# Patient Record
Sex: MALE | Race: WHITE
[De-identification: names, ages, dates, MRNs, and addresses within clinical notes are randomized per-mention and may not be internally consistent; named-entity substitution may affect disease eponyms.]

---

## 2020-11-09 PROBLEM — Z00.00 ENCOUNTER FOR PREVENTIVE HEALTH EXAMINATION: Status: ACTIVE | Noted: 2020-11-09

## 2020-11-10 ENCOUNTER — APPOINTMENT (OUTPATIENT)
Dept: OTOLARYNGOLOGY | Facility: CLINIC | Age: 50
End: 2020-11-10
Payer: MEDICARE

## 2020-11-10 VITALS — BODY MASS INDEX: 27.46 KG/M2 | HEIGHT: 63 IN | WEIGHT: 155 LBS

## 2020-11-10 DIAGNOSIS — Z87.891 PERSONAL HISTORY OF NICOTINE DEPENDENCE: ICD-10-CM

## 2020-11-10 PROCEDURE — 99204 OFFICE O/P NEW MOD 45 MIN: CPT

## 2020-11-10 PROCEDURE — 99072 ADDL SUPL MATRL&STAF TM PHE: CPT

## 2020-11-10 NOTE — HISTORY OF PRESENT ILLNESS
[de-identified] : LETI LOREDO has a history of hearing loss and tinnitus in the right ear since a fall with head injury in 2012. Surgery necessary for c-spine injury. Complete and total hearing loss noted with loud tinnitus now getting worse.  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  Multiple tones noted in the right ear.  Prior  experience. \par No facial nerve injury\par using cross amplification

## 2020-11-10 NOTE — REASON FOR VISIT
[Initial Consultation] : an initial consultation for [Ear Pain] : ear pain [Tinnitus] : tinnitus [Vertigo] : vertigo

## 2020-11-10 NOTE — CONSULT LETTER
[Please see my note below.] : Please see my note below. [FreeTextEntry2] : Dear Dr Arun Drew [FreeTextEntry1] : Thank you for allowing me to participate in the care of LETI LOREDO .\par Please see the attached visit note.\par \par \par \par Max Ashley\par Otology\par Medical Director of [facility]\par Department of Otolaryngology\par [facility]  with patient

## 2020-11-10 NOTE — ASSESSMENT
[FreeTextEntry1] : Interested tinnitus in the right ear as many years duration stemming from a temporal bone/head injury. I've discussed measure strategies for tinnitus. I have recommended coping strategies. He may be a candidate for cochlear implantation for single sided deafness and tinnitus. This would depend on the preservation of cochlear anatomy and the cochlear nerve. He expressed interest in pursuing this.\par \par CT scan of the temporal bone ordered. He was instructed to followup with me by phone or in person to review these images

## 2023-10-18 ENCOUNTER — APPOINTMENT (OUTPATIENT)
Dept: NEUROLOGY | Facility: CLINIC | Age: 53
End: 2023-10-18
Payer: MEDICARE

## 2023-10-18 VITALS
DIASTOLIC BLOOD PRESSURE: 80 MMHG | HEIGHT: 63 IN | BODY MASS INDEX: 30.11 KG/M2 | SYSTOLIC BLOOD PRESSURE: 134 MMHG | HEART RATE: 108 BPM

## 2023-10-18 VITALS — BODY MASS INDEX: 28.32 KG/M2 | HEIGHT: 65 IN | WEIGHT: 170 LBS

## 2023-10-18 DIAGNOSIS — Z80.9 FAMILY HISTORY OF MALIGNANT NEOPLASM, UNSPECIFIED: ICD-10-CM

## 2023-10-18 DIAGNOSIS — Z81.8 FAMILY HISTORY OF OTHER MENTAL AND BEHAVIORAL DISORDERS: ICD-10-CM

## 2023-10-18 PROCEDURE — 99204 OFFICE O/P NEW MOD 45 MIN: CPT

## 2023-10-27 ENCOUNTER — APPOINTMENT (OUTPATIENT)
Dept: NEUROLOGY | Facility: CLINIC | Age: 53
End: 2023-10-27
Payer: MEDICARE

## 2023-10-27 PROCEDURE — 95911 NRV CNDJ TEST 9-10 STUDIES: CPT

## 2023-10-27 PROCEDURE — 95886 MUSC TEST DONE W/N TEST COMP: CPT

## 2023-11-21 ENCOUNTER — APPOINTMENT (OUTPATIENT)
Dept: NEUROLOGY | Facility: CLINIC | Age: 53
End: 2023-11-21
Payer: MEDICARE

## 2023-11-21 PROCEDURE — 95816 EEG AWAKE AND DROWSY: CPT

## 2023-12-04 ENCOUNTER — APPOINTMENT (OUTPATIENT)
Dept: NEUROLOGY | Facility: CLINIC | Age: 53
End: 2023-12-04

## 2023-12-05 ENCOUNTER — APPOINTMENT (OUTPATIENT)
Dept: NEUROLOGY | Facility: CLINIC | Age: 53
End: 2023-12-05
Payer: MEDICARE

## 2023-12-05 PROCEDURE — 95719 EEG PHYS/QHP EA INCR W/O VID: CPT

## 2023-12-12 ENCOUNTER — APPOINTMENT (OUTPATIENT)
Dept: NEUROPSYCHOLOGY | Facility: CLINIC | Age: 53
End: 2023-12-12

## 2023-12-12 ENCOUNTER — OUTPATIENT (OUTPATIENT)
Dept: OUTPATIENT SERVICES | Facility: HOSPITAL | Age: 53
LOS: 1 days | End: 2023-12-12
Payer: MEDICARE

## 2023-12-12 DIAGNOSIS — G31.84 MILD COGNITIVE IMPAIRMENT OF UNCERTAIN OR UNKNOWN ETIOLOGY: ICD-10-CM

## 2023-12-12 PROCEDURE — 96121 NUBHVL XM PHY/QHP EA ADDL HR: CPT

## 2023-12-12 PROCEDURE — 96116 NUBHVL XM PHYS/QHP 1ST HR: CPT

## 2023-12-13 DIAGNOSIS — G31.84 MILD COGNITIVE IMPAIRMENT OF UNCERTAIN OR UNKNOWN ETIOLOGY: ICD-10-CM

## 2023-12-14 ENCOUNTER — APPOINTMENT (OUTPATIENT)
Dept: NEUROPSYCHOLOGY | Facility: CLINIC | Age: 53
End: 2023-12-14

## 2023-12-14 ENCOUNTER — OUTPATIENT (OUTPATIENT)
Dept: OUTPATIENT SERVICES | Facility: HOSPITAL | Age: 53
LOS: 1 days | End: 2023-12-14
Payer: MEDICARE

## 2023-12-14 DIAGNOSIS — G31.84 MILD COGNITIVE IMPAIRMENT OF UNCERTAIN OR UNKNOWN ETIOLOGY: ICD-10-CM

## 2023-12-14 PROCEDURE — 96133 NRPSYC TST EVAL PHYS/QHP EA: CPT

## 2023-12-14 PROCEDURE — 96136 PSYCL/NRPSYC TST PHY/QHP 1ST: CPT

## 2023-12-14 PROCEDURE — 96137 PSYCL/NRPSYC TST PHY/QHP EA: CPT

## 2023-12-14 PROCEDURE — 96132 NRPSYC TST EVAL PHYS/QHP 1ST: CPT

## 2023-12-15 ENCOUNTER — APPOINTMENT (OUTPATIENT)
Dept: NEUROLOGY | Facility: CLINIC | Age: 53
End: 2023-12-15
Payer: MEDICARE

## 2023-12-15 VITALS
SYSTOLIC BLOOD PRESSURE: 127 MMHG | BODY MASS INDEX: 30.12 KG/M2 | WEIGHT: 170 LBS | DIASTOLIC BLOOD PRESSURE: 85 MMHG | HEART RATE: 93 BPM | HEIGHT: 63 IN

## 2023-12-15 DIAGNOSIS — R41.3 UNSPECIFIED INJURY OF HEAD, INITIAL ENCOUNTER: ICD-10-CM

## 2023-12-15 DIAGNOSIS — S09.90XA UNSPECIFIED INJURY OF HEAD, INITIAL ENCOUNTER: ICD-10-CM

## 2023-12-15 PROCEDURE — 99215 OFFICE O/P EST HI 40 MIN: CPT

## 2023-12-15 PROCEDURE — 99214 OFFICE O/P EST MOD 30 MIN: CPT

## 2023-12-15 NOTE — HISTORY OF PRESENT ILLNESS
[FreeTextEntry1] : Original Presentation ; Mr. 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 He was brought to the ED in  and CTH revealed and facial fractures and Xray of the wrist revealed a left wrist fracture. He was found to have a herniated cervical disc at C5 and subsequently had a discectomy. Patient has had neck and back pain since the accident.  Patient has also had headaches on a regular basis since the accident. He awakens with the headache. He also complains of memory loss, dizziness, insomnia, word finding difficulty, and confusion. He was using Adderall for confusion and word finding difficulty, Fioricet for the headaches, and amitriptyline for the insomnia. Patient has decreased concentration and fear of driving. He is looking for a doctor to prescribe his medication as he has been unable to find one who takes his insurance. He has no results from his work up that was done 11 years ago. Patient has not worked since the accident and is not currently working.  Diagnostic Testing :  MRI Brain 11.3.23: Within normal limits  EEg 12.4.23 : normal  EMG [place] : Normal   Today : Today I had the pleasure of seeing  Mr. Mina  in our office for follow up.  The patients previous history and physical findings have been reviewed.    Mr. Mina remains under our neurologic care for post concussive syndrome, chronic cervical radiculopathy, memory changes, dizziness and post concussive headaches which began following a work related injury DOI 12.8.2012 (case now closed) establishing new care.  Today, we reviewed the results of his MRI of the Brain, EEg, and EMG of the upper extremities noted above. MRI of the cervical spine was denied authorization and neuropsychological testing is still pending completion. Regarding his neck pain he is s/p C5 discectomy preformed by Dr. Leonardo at [facility] following his injury. Today he report persistent neck pain isolated to his posterior cervical-occipital region without radiation down his arms. He rates his neck pain a 6-7/10 and a 9/10 when he has severe exacerbations and notes weakness in his RUE however on exam LUE appears weaker.  He has been participating in physical therapy for his neck and has completed 6 sessions and was approved for continued therapy. He states PT has helped slightly but still experiences daily pain and discomfort.  Previously under the care of Dr. Schwartz who prescribed Amitriptyline 100mg once at night which helped him sleep and helped with his headaches, Fioricet which he would take PRN for acute headaches and meloxicam for his neck pain howebver he is no lnger on any of these medications since switching providers and insurance. Today he reports persistent difficulty sleeping and reports an average of 4-5 hours a night due to difficulty falling and staying asleep. Regarding his post concussive headaches he reports daily headache episodes often associated with dizziness, sensitivity to light and occasionally nausea. He states his headaches are a mix of posterior tensions-type to his cervical- occipital region, some behind his eyes and other times to the his frontal lobe. For his headaches currently takes OTC Tylenol which he states doesn't relieve his symptoms but keeps it from progressing. Regarding his memory changes he reports difficulty with word finding, often jumps from topic to topic, and changes with both recent and remote memory. On exam he is Aox3 with significant impairments with word finding and fluency. At his last visit he was provided a referral for neuropsychological testing which he has an appointment for next week. 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

## 2023-12-15 NOTE — PHYSICAL EXAM
[General Appearance - Alert] : alert [General Appearance - In No Acute Distress] : in no acute distress [Oriented To Time, Place, And Person] : oriented to person, place, and time [Impaired Insight] : insight and judgment were intact [Affect] : the affect was normal [Mood] : the mood was normal [Person] : oriented to person [Place] : oriented to place [Time] : oriented to time [Remote Intact] : remote memory intact [Visual Intact] : visual attention was ~T not ~L decreased [Naming Objects] : no difficulty naming common objects [Repeating Phrases] : no difficulty repeating a phrase [Writing A Sentence] : no difficulty writing a sentence [Comprehension] : comprehension intact [Reading] : reading intact [Past History] : adequate knowledge of personal past history [Cranial Nerves Optic (II)] : visual acuity intact bilaterally,  visual fields full to confrontation, pupils equal round and reactive to light [Cranial Nerves Oculomotor (III)] : extraocular motion intact [Cranial Nerves Trigeminal (V)] : facial sensation intact symmetrically [Cranial Nerves Facial (VII)] : face symmetrical [Cranial Nerves Vestibulocochlear (VIII)] : hearing was intact bilaterally [Cranial Nerves Glossopharyngeal (IX)] : tongue and palate midline [Cranial Nerves Accessory (XI - Cranial And Spinal)] : head turning and shoulder shrug symmetric [Cranial Nerves Hypoglossal (XII)] : there was no tongue deviation with protrusion [Motor Tone] : muscle tone was normal in all four extremities [Motor Strength] : muscle strength was normal in all four extremities [No Muscle Atrophy] : normal bulk in all four extremities [Motor Strength Upper Extremities Bilaterally] : there was weakness in both upper extremities [Sensation Tactile Decrease] : light touch was intact [Balance] : balance was intact [2+] : Ankle jerk left 2+ [PERRL With Normal Accommodation] : pupils were equal in size, round, reactive to light, with normal accommodation [Extraocular Movements] : extraocular movements were intact [Hearing Threshold Finger Rub Not Harper] : hearing was normal [Abnormal Walk] : normal gait [Involuntary Movements] : no involuntary movements were seen [Short Term Intact] : short term memory impaired [Concentration Intact] : a decrease in concentrating ability was observed [Fluency] : fluency not intact [Motor Strength Lower Extremities Bilaterally] : strength was normal in both lower extremities [Past-pointing] : there was no past-pointing [Tremor] : no tremor present [Coordination - Dysmetria Impaired Finger-to-Nose Bilateral] : not present [Plantar Reflex Right Only] : normal on the right [Plantar Reflex Left Only] : normal on the left [FreeTextEntry6] : Right hand  4-/5 Left hand  5/5  RUE 4-/5 bicep flexion / triceps extension LUE 4-/5 triceps [FreeTextEntry1] : Limited ROM and cerico-occipital tenderness

## 2023-12-15 NOTE — REVIEW OF SYSTEMS
[Memory Lapses or Loss] : memory loss [Decr. Concentrating Ability] : decreased concentrating ability [Difficulty with Language] : ~M difficulty with language [Arm Weakness] : arm weakness [Dizziness] : dizziness [Vertigo] : vertigo [Migraine Headache] : migraine headaches [Tension Headache] : tension-type headaches

## 2023-12-15 NOTE — REASON FOR VISIT
[FreeTextEntry1] : Patient: Mike Mina   Referred by: Dr. Tejinder Parsons MD   YOB: 1970   Date of Intake: 12/12/2023   Age: 53   Evaluators: Dr. Kamilah Magallanes PsyD      Reason for Referral: Mike Mina is a 53-year-old, right hand dominant male with a history of traumatic brain injury (2012) who was referred for neuropsychological testing to determine his current cognitive and emotional status.       Examination Procedures: Clinical interview with the patient; Review of available records; Psychometric evaluation as listed in the appendix.       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 Patient reported that had loss of consciousness, facial fractures, right ear/hearing loss. He also had a fracture of the left wrist. He was found to have a herniated cervical disc at C5 and subsequently had a discectomy. Patient has had neck and back ongoing pain since the accident.      Cognitively, patient reported that experienced attention, short-term memory loss, word finding difficulties and slow motor processing. Patient added that he underwent neuropsychological evaluations during his work compensation and was referred to cognitive remediation therapy, which was found to be helpful at the time. Patient stated that his case was settled in 2017/2018 but given the ongoing symptoms now is seeking continue care.       That is, the patient presents today due to ongoing cognitive problems that indicated hasn't resolved and it's impacting his daily functioning. Patient reported continues to experience attentional problems such as forgetting what he set out to do, periods of loss of set and easily distracted. Furthermore, reported forgetfulness such as losing set mid conversation, difficulty retrieving the words he wants to express and misplacing things.       Patient underwent a neurological evaluation in October 2023 following ongoing headaches on a regular basis since the accident. He reported that awakens with the headache; from the occipital towards the top/parietal area described as a sharp pain. He also reported pressure headaches behind his eyes. He also complained of memory loss, dizziness, insomnia, word finding difficulty, and confusion. He reported using Adderall for confusion and word finding difficulty but not currently. He also reported taking Fioricet for the headaches, and amitriptyline for the insomnia but stopped taking it instead using over the counter for sleep.      Relevant Imaging and Labs:    MRI of the brain: Not available at this time. Patient was instructed to call radiology to obtain results.    EEG: Normal and Normal 24 Ambulatory monitoring.    EMG: study of the upper extremities results; normal    Cognitive Symptoms: attentional and memory problems   Physical Symptoms: headaches, dizziness and nauseas   Emotional Symptoms:  overwhelmed    ADLs/IADLs: independent    Sleep Behavior: disrupted due to neck and shoulder pain    Diet/Exercise: wnl      Medical History:    H/o TBI otherwise healthy    Family history includes cancer and dementia    Current Medications: not taking medications at this time; only unisom (otc) for sleep   Substance Use:       Psychiatric History:    Denied      Social History:   Education: Patient completed high school and some classes in college   Employment Status: worked in [employer] but stopped working 2012 after the head injury; currently in disability.     Marital/Family Status: lives with wife      Behavioral Observations:    Appearance: wnl    Demeanor: cooperative   Arousal: alert   Mood: euthymic   Affect: conguent   Motor System: wnl   Speech and Language: wnl; some word finding difficulty.   Cognitive Processes: wnl          Diagnostic Impression:       Post Concussion Syndrome vs Mild Neurocognitive Impairment Unspecified       Plan and Duration (check all that apply):       X Neuropsychological/Psychological Evaluation 	      Short term Goals (4 weeks; check all that apply):   X Evaluate Mood and Anxiety 	X Evaluate Cognition 		    X Provide Psychoeducation 			      Long-term Goal (8 weeks; check all that apply):   X Complete Evaluation and Provide Feedback to Patient, Family and/or Referral Source      Goal Setting/Treatment Initiation:   X Goals mutually agreed with patient and/or family    X Treatment established based on physician prescription      Number of anticipated sessions:    X testing sessions (120 min/session)   1 feedback session (60 min/session)      Prognosis:  Good       Special Considerations:  Hearing 	         Additional Treatment Rendered Today: None

## 2023-12-15 NOTE — REASON FOR VISIT
[Neuropsychology testing session] : Neuropsychology testing session [FreeTextEntry1] : Patient presents today for neuropsychological evaluation. Patient completed 1st part and will resume 2nd part next week.   Patient completed imaging of the brain, but results were not available. Patient was advised to reach out to radiology to obtain results.

## 2023-12-15 NOTE — ASSESSMENT
[FreeTextEntry1] : 53 year old male / female with post concussive syndrome, chronic cervical radiculopathy, memory changes, dizziness and post concussive headaches which began following a work related injury DOI 12.8.2012 (case now closed) establishing new care.  Today, we reviewed the results of his MRI of the Brain, EEg, and EMG of the upper extremities noted above. For his cervical radiculopathy he will continue physical therapy and we will re-submit for MRI C spine after 12.18.23. For his headaches and difficulty sleeping he will resume Amitriptyline 100mg once at night. for acute headaches he will trial Imitrex 100mg PRN. For isolated episodes of dizziness / vertigo he will begin meclizine 35mg PRN. For his memory / cognitive changes he will complete neuropsychologic testing and will return to the office in 1 month to review his results.    *All potential risks, benefits, side effects and interactions of the medications prescribed where discussed with patient at the time of todays encounter. Patient is aware that they may call/ contact the office at any time if he has any additional questions or concerns regarding his management.

## 2023-12-19 ENCOUNTER — APPOINTMENT (OUTPATIENT)
Dept: NEUROPSYCHOLOGY | Facility: CLINIC | Age: 53
End: 2023-12-19

## 2023-12-19 ENCOUNTER — OUTPATIENT (OUTPATIENT)
Dept: OUTPATIENT SERVICES | Facility: HOSPITAL | Age: 53
LOS: 1 days | End: 2023-12-19

## 2023-12-19 DIAGNOSIS — G31.84 MILD COGNITIVE IMPAIRMENT OF UNCERTAIN OR UNKNOWN ETIOLOGY: ICD-10-CM

## 2023-12-19 NOTE — REASON FOR VISIT
[Neuropsychology testing session] : Neuropsychology testing session [FreeTextEntry1] : Patient presented for the neuropsychological evaluation; testing was completed. Report/Result to follow in 2 weeks.

## 2024-01-02 ENCOUNTER — APPOINTMENT (OUTPATIENT)
Dept: NEUROPSYCHOLOGY | Facility: CLINIC | Age: 54
End: 2024-01-02

## 2024-01-02 ENCOUNTER — OUTPATIENT (OUTPATIENT)
Dept: OUTPATIENT SERVICES | Facility: HOSPITAL | Age: 54
LOS: 1 days | End: 2024-01-02
Payer: MEDICARE

## 2024-01-02 DIAGNOSIS — G31.84 MILD COGNITIVE IMPAIRMENT OF UNCERTAIN OR UNKNOWN ETIOLOGY: ICD-10-CM

## 2024-01-02 PROCEDURE — 96132 NRPSYC TST EVAL PHYS/QHP 1ST: CPT | Mod: 95

## 2024-01-02 PROCEDURE — 96133 NRPSYC TST EVAL PHYS/QHP EA: CPT | Mod: 95

## 2024-01-03 DIAGNOSIS — G31.84 MILD COGNITIVE IMPAIRMENT OF UNCERTAIN OR UNKNOWN ETIOLOGY: ICD-10-CM

## 2024-01-05 NOTE — REASON FOR VISIT
[FreeTextEntry1] : Patient: Mike Mina   Referred by: Dr. Tejinder Parsons MD   YOB: 1970   Date of Intake: 12/12/2023   Age: 53   Evaluators: Dr. Kamilah Magallanes PsyD     Test Results:   Please see Scan report to find the data summary at the end of the report for information regarding tests administered, raw scores, and percentiles.   Patient's effort was evaluated with multiple formal standardized measures, which evidenced valid effort across all tests.    Test results demonstrated estimated premorbid functioning within the average to high average range; stronger verbal than nonverbal abilities. His neurocognitive performances were consistent with premorbid functioning except for weakness in the areas of processing speed moderated by deficient fine motor function. He evidenced variability with weaker verbal than nonverbal recall, suggesting mild retrieval difficulties with verbal information, but this was not consistent when recalling non-verbal information. His verbal recall improved with recognition. Overall, his ability to learn, retain and store new information was not indicative of a concern and did not evidence a memory impairment at this time. That is, his ability to recall verbal information likely moderated by hearing loss and attentional fluctuations; his non-verbal recall abilities were in the high average to superior ranges. His language and fluency performances were within the normal limits and there was no objective evidence of word finding difficulties during the evaluation. However, during conversational speech, he did show frequent pauses to search for words but able to come up with the words. Regarding his attention, he did evidence loss of set and set shifting errors at higher order demands (more complex items) but overall performances within the normal limits.    Taken together, his presentation demonstrates weak motor function negatively impacting his processing speed. This can be explained when required to do hand -eye coordination tasks as well when processing information in both verbal and nonverbal manner. The patient exhibits notable delays in processing and motor abilities, requiring additional time for information assimilation and effective execution. Under time constraints, pressure, or stressful conditions, the patient is likely to encounter challenges in these domains and further impacting his ability to remember certain details. Also, patient lost his hearing in the fall which can complicate such situations. Moreover, this processing delays can also explain his subjective symptoms of finding the words. His presentation does meet criteria for a mild neurocognitive disorder; however, etiology appears multifactorial as pain, depression, anxiety and sleep deprivation interfere with cognition.   In the current assessment, there is insufficient evidence to establish a direct correlation between the current presenting symptoms and the head injury that occurred over 13 years ago. Nonetheless, the lingering physical consequences stemming from the fall, leading to persistent pain and requiring multiple surgeries, provide a plausible explanation for the observed compromised processing and motor functions in the patient. Furthermore, the coexistence of symptoms indicative of depression, anxiety, and sleep deprivation adds complexity to the clinical picture. It is important to note that pain and depression often manifest concurrently, and disruptions in sleep patterns are recognized contributors to both processing and motor deficits. These interrelated factors may contribute to the exacerbation of the patient's overall presentation.     Diagnostic Impression: G31.84 Mild Neurocognitive Impairment due to another medical; cervical radiculopathy; Headaches.    Traumatic Brain Injury (2012)    Depression    Anxiety    Sleep Deprivation         Recommendations:       It is warranted to continue follow up with neurology and pain management as the patient's main symptoms are headaches, sleep deprivation and pain.       The patient will benefit from a psychological evaluation and psychotherapy and it's highly recommended given the likely permanent ongoing pain due to the post effects of the injury and multiple surgeries. He will benefit from a dynamic therapeutic approach such as ACT, CBT, Motivational Interview, and/or Solution Focused to address pain and mood symptoms related to his health and adjustment as well as living with disability. Psychology/psychotherapy will also aid with insomnia (racing thoughts) providing sleep hygiene recommendations.   Following motor weakness, the patient should attempt to slow down when engaging in fine motor tasks and focus on accuracy and precision, rather than speed. Also, the patient should continue to engage in (approved by physician) physical activities. The patient is currently receiving PT addressing his physical pain and injuries and should continue to follow up.        Memory techniques such as using imagery and visual mnemonics to remember verbal material may be used, given the patient's good ability to remember routine visual information. Also, Repeated exposure to new material, cues, and reminders are highly recommended to improve memory and learning.        Following the patient's processing/motor cognitive weaknesses, Mr. Mina will benefit from maintaining himself cognitively stimulated as well as socially active to promote overall well-being. He will benefit from cognitive training/remediation program but if such is not feasible there are also cognitive training phone apps or workbooks that he will benefit from as well.      It is noteworthy to mention that depression and pain coexist thus pain and depression create a vicious cycle in which pain worsens symptoms of depression, and vice versa. As a result, I recommend psychotherapy and/or hobbies to maintain an active lifestyle. Given that the patient is currently disability, he is at high risk for sedentary lifestyle/behaviors which is well known for mental health and general health risks.       Following head injury and family history of dementia, it is recommended that patient's cognitive functioning continue to be monitored, particularly if cognitive symptoms change or worsen.       Results and recommendations were provided and discussed with the patient. Patient stated that understood the results and the recommendations.       Thank you for the referral, please don't hesitate to contact me if you have any questions.

## 2024-01-19 ENCOUNTER — APPOINTMENT (OUTPATIENT)
Dept: NEUROLOGY | Facility: CLINIC | Age: 54
End: 2024-01-19
Payer: MEDICARE

## 2024-01-19 DIAGNOSIS — F09 UNSPECIFIED MENTAL DISORDER DUE TO KNOWN PHYSIOLOGICAL CONDITION: ICD-10-CM

## 2024-01-19 DIAGNOSIS — F07.81 POSTCONCUSSIONAL SYNDROME: ICD-10-CM

## 2024-01-19 PROCEDURE — 99214 OFFICE O/P EST MOD 30 MIN: CPT

## 2024-01-19 NOTE — PHYSICAL EXAM
[General Appearance - Alert] : alert [General Appearance - In No Acute Distress] : in no acute distress [Oriented To Time, Place, And Person] : oriented to person, place, and time [Impaired Insight] : insight and judgment were intact [Affect] : the affect was normal [Mood] : the mood was normal [Person] : oriented to person [Place] : oriented to place [Time] : oriented to time [Remote Intact] : remote memory intact [Visual Intact] : visual attention was ~T not ~L decreased [Naming Objects] : no difficulty naming common objects [Repeating Phrases] : no difficulty repeating a phrase [Writing A Sentence] : no difficulty writing a sentence [Comprehension] : comprehension intact [Reading] : reading intact [Past History] : adequate knowledge of personal past history [Cranial Nerves Optic (II)] : visual acuity intact bilaterally,  visual fields full to confrontation, pupils equal round and reactive to light [Cranial Nerves Trigeminal (V)] : facial sensation intact symmetrically [Cranial Nerves Oculomotor (III)] : extraocular motion intact [Cranial Nerves Facial (VII)] : face symmetrical [Cranial Nerves Vestibulocochlear (VIII)] : hearing was intact bilaterally [Cranial Nerves Glossopharyngeal (IX)] : tongue and palate midline [Cranial Nerves Accessory (XI - Cranial And Spinal)] : head turning and shoulder shrug symmetric [Cranial Nerves Hypoglossal (XII)] : there was no tongue deviation with protrusion [Motor Tone] : muscle tone was normal in all four extremities [Motor Strength] : muscle strength was normal in all four extremities [No Muscle Atrophy] : normal bulk in all four extremities [Motor Strength Upper Extremities Bilaterally] : there was weakness in both upper extremities [Sensation Tactile Decrease] : light touch was intact [Balance] : balance was intact [2+] : Ankle jerk left 2+ [PERRL With Normal Accommodation] : pupils were equal in size, round, reactive to light, with normal accommodation [Extraocular Movements] : extraocular movements were intact [Hearing Threshold Finger Rub Not [place]] : hearing was normal [Abnormal Walk] : normal gait [Involuntary Movements] : no involuntary movements were seen [Short Term Intact] : short term memory impaired [Concentration Intact] : a decrease in concentrating ability was observed [Fluency] : fluency not intact [Motor Strength Lower Extremities Bilaterally] : strength was normal in both lower extremities [Past-pointing] : there was no past-pointing [Tremor] : no tremor present [Coordination - Dysmetria Impaired Finger-to-Nose Bilateral] : not present [Plantar Reflex Right Only] : normal on the right [Plantar Reflex Left Only] : normal on the left [FreeTextEntry6] : Right hand  4-/5 Left hand  5/5  RUE 4-/5 bicep flexion / triceps extension LUE 4-/5 triceps [FreeTextEntry1] : Limited ROM and cerico-occipital tenderness

## 2024-01-19 NOTE — ASSESSMENT
[FreeTextEntry1] : 53 year old male with chronic cervical radiculopathy, mild cognitive disorder - multifactorial, chronic headaches and dizziness following an accident in 2012. Today we reviewed his Neuropsychological evaluation which met criteria for a mild cognitive disorder and stated, "etiology appears multifactorial... insufficient evidence to establish a direct correlation between his current symptoms and the head injury that occurred over 13 years ago. Nonetheless, the lingering physical consequences stemming from the fall, leading to persistent pain and requiring multiple surgeries, provide a plausible explanation for the observed compromised processing and motor functions in the patient." He will follow up with Psychology if he wishes to pursue outpatient psychotherapy. For his chronic headaches and dizziness, he will continue Amitriptyline 100mg once daily at night, sumatriptan 100mg PRN and Meclizine 25mg PRN as is without change. Regarding his chronic neck pain, since he has now completed 4-6 weeks of PT without significant improvement, we will re-order non-contrast MRI of the Cervical spine and he will return to the office to review his results once completed.

## 2024-01-19 NOTE — HISTORY OF PRESENT ILLNESS
[FreeTextEntry1] : Original Presentation ; Mr. 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 He was brought to the ED in  and CTH revealed and facial fractures and Xray of the wrist revealed a left wrist fracture. He was found to have a herniated cervical disc at C5 and subsequently had a discectomy. Patient has had neck and back pain since the accident.  Patient has also had headaches on a regular basis since the accident. He awakens with the headache. He also complains of memory loss, dizziness, insomnia, word finding difficulty, and confusion. He was using Adderall for confusion and word finding difficulty, Fioricet for the headaches, and amitriptyline for the insomnia. Patient has decreased concentration and fear of driving. He is looking for a doctor to prescribe his medication as he has been unable to find one who takes his insurance. He has no results from his work up that was done 11 years ago. Patient has not worked since the accident and is not currently working.  Diagnostic Testing :  MRI Brain 11.3.23: Within normal limits  EEg 12.4.23 : normal  EMG Uppers : Normal   Today : Today I had the pleasure of seeing  Mr. Mina  in our office for follow up.  The patients previous history and physical findings have been reviewed.   Mr. Mina remains under our neurologic care for post concussive syndrome, chronic cervical radiculopathy, memory changes, dizziness and post concussive headaches which began following a work related injury DOI 12.8.2012 (case now closed)  Today, we reviewed the results of his Neuropsychological testing preformed by Dr. Magallanes on 12.12.23. Patients assessment showed "insufficient evidence to establish a direct correlation between the current presenting symptoms and the head injury that occurred over 13 years ago. Nonetheless, the lingering physical consequences stemming from the fall, leading to persistent pain and requiring multiple surgeries, provide a plausible explanation for the observed compromised processing and motor functions in the patient" and patient was recommended "psychotherapy with a dynamic therapeutic approach such as ACT, CBT, Motivational Interview, and/or Solution Focused to address pain and mood symptoms related to his health and adjustment as well as living with disability."   Regarding his chronic cervical radiculopathy which he rates a 6-7/10 associated with weakness to bilateral upper extremities and weak hand , prior request for MRI imaging of the C spine was denied. Since his last visit he has been participating in physical therapy for his neck (x4-6wks) and today reports mild decrease in his neck pain (now a 5-6/10) however notes no change in his upper extremity strength or range of motion. today we will discuss re-ordering MRI of the C spine for insurance authorization. OF Note :  Patient is s/p C5 discectomy preformed by Dr. Leonardo at [facility] following his injury in 2012.   With respect to his chronic headaches and migraines which alternate between tension type headaches likely cervicogenic and common migraines with dizziness, he remains on a medication regimen of Amitriptyline 100mg once at night for migraine prophylaxis, Sumatriptan 100mg PRN as abortive therapy for acute migraines and Meclizine 25mg PRN. He denies any adverse side effects from these medications and states they work well to reduce his headache episodes and relieve his symptoms if they do occur.

## 2024-02-16 ENCOUNTER — APPOINTMENT (OUTPATIENT)
Dept: NEUROLOGY | Facility: CLINIC | Age: 54
End: 2024-02-16
Payer: MEDICARE

## 2024-02-16 VITALS — DIASTOLIC BLOOD PRESSURE: 91 MMHG | SYSTOLIC BLOOD PRESSURE: 145 MMHG | HEART RATE: 117 BPM

## 2024-02-16 PROCEDURE — 99214 OFFICE O/P EST MOD 30 MIN: CPT

## 2024-02-16 NOTE — HISTORY OF PRESENT ILLNESS
[FreeTextEntry1] : Original Presentation ; Mr. 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 He was brought to the ED in  and CTH revealed and facial fractures and Xray of the wrist revealed a left wrist fracture. He was found to have a herniated cervical disc at C5 and subsequently had a discectomy. Patient has had neck and back pain since the accident. Patient has also had headaches on a regular basis since the accident. He awakens with the headache. He also complains of memory loss, dizziness, insomnia, word finding difficulty, and confusion. He was using Adderall for confusion and word finding difficulty, Fioricet for the headaches, and amitriptyline for the insomnia. Patient has decreased concentration and fear of driving. He is looking for a doctor to prescribe his medication as he has been unable to find one who takes his insurance. He has no results from his work up that was done 11 years ago. Patient has not worked since the accident and is not currently working.   Diagnostic Testing :  MRI Brain 11.3.23: Within normal limits  EEg 12.4.23 : normal  EMG Uppers 10.27.23  : Normal  MRI Cervical Spine 2.11.24 :  Right sided disc protrusion C6-7 with marked proximal right foraminal compromise. Post surgical changes at C5-6 with mild right foraminal compromise. mild right foraminal compromise at C3-4.   Today : Today I had the pleasure of seeing Mr. Mina in our office for follow up. The patients previous history and physical findings have been reviewed.   Mr. Mina remains under our neurologic care for post concussive syndrome, chronic cervical radiculopathy, memory changes, dizziness and post concussive headaches which began following a work related injury DOI 12.8.2012 (case now closed) At his previous visit we reviewed the results of his Neuropsychological testing which  showed "insufficient evidence to establish a direct correlation between the current presenting symptoms and the head injury that occurred over 13 years ago. Nonetheless, the lingering physical consequences stemming from the fall, leading to persistent pain and requiring multiple surgeries, provide a plausible explanation for the observed compromised processing and motor functions in the patient" and patient was recommended "psychotherapy with a dynamic therapeutic approach such as ACT, CBT, Motivational Interview, and/or Solution Focused to address pain and mood symptoms related to his health and adjustment as well as living with disability."      Regarding his chronic cervical radiculopathy patient is C5 discectomy preformed by Dr. Leonardo at [facility] following his injury in 2012. He rates his neck pain a 6-7/10 associated with weakness to bilateral upper extremities and weak hand .  He has participated in physical therapy (x4-6wks) with only mild improvement of his neck pain and notes no change in his upper extremity strength or range of motion. Today we reviewed his MRI of the Cervical spine preformed 2.11.24  noted above. Today we discussed being seen and evaluated by pain management to discuss potential interventions given his failure of conservative management.        With respect to his chronic headaches and migraines which alternate between tension type headaches likely cervicogenic and common migraines with dizziness, he remains on a medication regimen of Amitriptyline 100mg once at night for migraine prophylaxis, Sumatriptan 100mg PRN as abortive therapy for acute migraines and Meclizine 25mg PRN. He denies any adverse side effects from these medications and states they work well to reduce his headache episodes and relieve his symptoms if they do occur.

## 2024-02-16 NOTE — ASSESSMENT
[FreeTextEntry1] : 53 year old male with chronic cervical radiculopathy s/p C5 discectomy at [facility] following in 2012.  mild cognitive disorder - multifactorial, and chronic headaches and dizziness following an accident in 2012. Today we reviewed his MRI of the Cervical spine noted above. Patient has had little to no improvement of his neck pain with physical therapy and today he was provided referral to be seen and evaluated by pain management to discuss potential interventions / injections for his neck. For his chronic headaches and dizziness, he will continue Amitriptyline 100mg once daily at night, sumatriptan 100mg PRN and Meclizine 25mg PRN as is without change. Patient will return to the office in 3 months for follow up.  Patient is aware that they may call/ contact the office at any time if they have any additional questions or concerns regarding their management. *All potential risks, benefits, side effects and interactions of any medications prescribed where discussed in detail with patient at the time of todays encounter.*

## 2024-02-16 NOTE — PHYSICAL EXAM
[General Appearance - Alert] : alert [General Appearance - In No Acute Distress] : in no acute distress [Oriented To Time, Place, And Person] : oriented to person, place, and time [Impaired Insight] : insight and judgment were intact [Affect] : the affect was normal [Mood] : the mood was normal [Person] : oriented to person [Place] : oriented to place [Time] : oriented to time [Remote Intact] : remote memory intact [Visual Intact] : visual attention was ~T not ~L decreased [Naming Objects] : no difficulty naming common objects [Repeating Phrases] : no difficulty repeating a phrase [Writing A Sentence] : no difficulty writing a sentence [Comprehension] : comprehension intact [Reading] : reading intact [Past History] : adequate knowledge of personal past history [Cranial Nerves Optic (II)] : visual acuity intact bilaterally,  visual fields full to confrontation, pupils equal round and reactive to light [Cranial Nerves Oculomotor (III)] : extraocular motion intact [Cranial Nerves Trigeminal (V)] : facial sensation intact symmetrically [Cranial Nerves Facial (VII)] : face symmetrical [Cranial Nerves Vestibulocochlear (VIII)] : hearing was intact bilaterally [Cranial Nerves Glossopharyngeal (IX)] : tongue and palate midline [Cranial Nerves Accessory (XI - Cranial And Spinal)] : head turning and shoulder shrug symmetric [Cranial Nerves Hypoglossal (XII)] : there was no tongue deviation with protrusion [Motor Tone] : muscle tone was normal in all four extremities [No Muscle Atrophy] : normal bulk in all four extremities [Motor Strength] : muscle strength was normal in all four extremities [Motor Strength Upper Extremities Bilaterally] : there was weakness in both upper extremities [Sensation Tactile Decrease] : light touch was intact [Balance] : balance was intact [2+] : Ankle jerk left 2+ [PERRL With Normal Accommodation] : pupils were equal in size, round, reactive to light, with normal accommodation [Extraocular Movements] : extraocular movements were intact [Hearing Threshold Finger Rub Not [place]] : hearing was normal [Abnormal Walk] : normal gait [Involuntary Movements] : no involuntary movements were seen [Short Term Intact] : short term memory impaired [Concentration Intact] : a decrease in concentrating ability was observed [Fluency] : fluency not intact [Motor Strength Lower Extremities Bilaterally] : strength was normal in both lower extremities [Past-pointing] : there was no past-pointing [Tremor] : no tremor present [Coordination - Dysmetria Impaired Finger-to-Nose Bilateral] : not present [Plantar Reflex Right Only] : normal on the right [Plantar Reflex Left Only] : normal on the left [FreeTextEntry6] : Right hand  4-/5 Left hand  5/5  RUE 4-/5 bicep flexion / triceps extension LUE 4-/5 triceps [FreeTextEntry1] : Limited ROM and cerico-occipital tenderness

## 2024-03-20 ENCOUNTER — APPOINTMENT (OUTPATIENT)
Dept: PAIN MANAGEMENT | Facility: CLINIC | Age: 54
End: 2024-03-20
Payer: MEDICARE

## 2024-03-20 PROCEDURE — 99204 OFFICE O/P NEW MOD 45 MIN: CPT

## 2024-03-21 NOTE — HISTORY OF PRESENT ILLNESS
[FreeTextEntry1] : Mr. Mina is a 53-year-old male presenting with neck pain. He has a prior history of cervical spine C5-6 discectomy. He has significant stiffness in the neck. He has pain being referred upwards causing "headaches: or just pain in the head. He at times feels like he got hit in the back of the head. He has range of motion restrictions especially when extending the neck. He has significant pain when looking to the left. He cannot look over his shoulder when driving at all. He has been taking Tylenol to help with the pain. Pain is present daily and constant in nature. He has limitation of his ADLs. He denies any changes in bowel/bladder habits, fevers/chills or any recent weight loss.

## 2024-03-21 NOTE — PHYSICAL EXAM
[de-identified] : C spine  No rashes, erythema, edema noted TTP b/l cervical paraspinal and trapezius  Limited ROM with neck extension and b/l left and right rotation 2/2 to pain Positive facet loading bilaterally RUE: 5/5 strength, sensation in tact LUE: 5/5 strength, sensation in tact

## 2024-03-21 NOTE — DATA REVIEWED
[FreeTextEntry1] : MRI of the cervical spine taken on 2- showed right sided disc protrusion at C6-7 with marked proximal right foraminal compromise. Post surgical changes at C5-6 with mild right foraminal compromise. Mild right foraminal compromise at C3-4.

## 2024-03-21 NOTE — DISCUSSION/SUMMARY
[de-identified] : A discussion regarding available pain management treatment options occurred with the patient. These included interventional, rehabilitative, pharmacological, and alternative modalities. We will proceed with the following:    Interventional treatment options: 1. Proceed with a Bilateral C3, C4, C5 medial branch facet block under fluoroscopic guidance and sedation.   Patient has cervical axial pain that is consistent with facet joint pathology. A diagnostic temporary block with local anesthetic of the medial branch was performed and has resulted in at least a 50% reduction in pain for the duration of the specific local anesthetic effect.  The pain is not radicular and there is absence of nerve root compression.  There is no prior spinal fusion surgery at the level targeted.  The pain has failed to respond to three months of conservative therapy.   The risks and benefits were discussed, which included the associated problems with steroids, bleeding, nerve injury, lack of improvement with pain   The patient has severe anxiety of procedures that necessitates monitored anesthesia care (MAC). The procedure performed will be close to major nerves, arteries, and spinal cord and/or joint structures. Due to the proximity of these structures, we need the patient to be still during the procedure.  With the help of MAC, this will be safely achieved and decrease the risk of any complications.   Medications: 1. Ordered Meloxicam 15 mg to be taken nightly.   I advised the patient that the NSAID should be taken with food.  In addition while taking the prescribed NSAID, no over the counter or other NSAIDs should be used, such as ibuprofen (Motrin or Advil) or naproxen (Aleve) as this can cause stomach upset or other side effects.  If needed for fever or breakthrough pain Tylenol can be used.  - Follow up 2 weeks post injection.   I Eliza Hinds attest that this documentation has been prepared under the direction and in the presence of provider Dr. Shayne Jim.  The documentation recorded by the scribe in my presence, accurately reflects the service I personally performed, and the decisions made by me with my edits as appropriate.   Shayne Jim, DO

## 2024-04-09 ENCOUNTER — APPOINTMENT (OUTPATIENT)
Dept: OTOLARYNGOLOGY | Facility: CLINIC | Age: 54
End: 2024-04-09
Payer: MEDICARE

## 2024-04-09 VITALS — HEIGHT: 63 IN | BODY MASS INDEX: 29.23 KG/M2 | WEIGHT: 165 LBS

## 2024-04-09 DIAGNOSIS — S02.19XS OTHER FRACTURE OF BASE OF SKULL, SEQUELA: ICD-10-CM

## 2024-04-09 DIAGNOSIS — Z78.9 OTHER SPECIFIED HEALTH STATUS: ICD-10-CM

## 2024-04-09 DIAGNOSIS — F17.210 NICOTINE DEPENDENCE, CIGARETTES, UNCOMPLICATED: ICD-10-CM

## 2024-04-09 DIAGNOSIS — Z77.22 CONTACT WITH AND (SUSPECTED) EXPOSURE TO ENVIRONMENTAL TOBACCO SMOKE (ACUTE) (CHRONIC): ICD-10-CM

## 2024-04-09 PROCEDURE — 92504 EAR MICROSCOPY EXAMINATION: CPT

## 2024-04-09 PROCEDURE — 99203 OFFICE O/P NEW LOW 30 MIN: CPT

## 2024-04-09 RX ORDER — PREDNISONE 10 MG/1
10 TABLET ORAL
Qty: 60 | Refills: 1 | Status: ACTIVE | COMMUNITY
Start: 2024-04-09 | End: 1900-01-01

## 2024-04-09 NOTE — HISTORY OF PRESENT ILLNESS
[de-identified] : LETI LOREDO has a history of severe right hearing loss from a skull fracture in or about 2012, tinnitus in the right ear. Wearing hearing aids. States that his hearing muffled left ear about 2 weeks ago. Feels slightly muffled in left. Complaining of tinnitus at different pitches. Denies any obstruction or fluid in ears. Seen by Dr. Drew. Told it is possibly swelling due to medications. Audiogram 4/4/24. Improvement noted since evaluation in 4/4/24,

## 2024-04-09 NOTE — ASSESSMENT
[FreeTextEntry1] : Sudden hearing loss in the only hearing left ear.  This is reportedly improving.  I have discussed this with him.  I have recommended treatment given that it is his only hearing ear.  I have placed him on oral prednisone.  I have recommended CT scan evaluation of the temporal bone which was previously recommended.  Follow-up with repeat audiometry in approximately 1 month.

## 2024-04-09 NOTE — CONSULT LETTER
[Please see my note below.] : Please see my note below. [FreeTextEntry2] : Dear Dr Arun Drew [FreeTextEntry1] : Thank you for allowing me to participate in the care of LETI LOREDO . Please see the attached visit note.    [facility] Medical Director of [facility] Department of Otolaryngology [facility]

## 2024-04-15 ENCOUNTER — APPOINTMENT (OUTPATIENT)
Dept: PAIN MANAGEMENT | Facility: CLINIC | Age: 54
End: 2024-04-15
Payer: MEDICARE

## 2024-04-15 ENCOUNTER — APPOINTMENT (OUTPATIENT)
Dept: PAIN MANAGEMENT | Facility: CLINIC | Age: 54
End: 2024-04-15

## 2024-04-15 PROCEDURE — 64490 INJ PARAVERT F JNT C/T 1 LEV: CPT | Mod: 50

## 2024-04-15 PROCEDURE — 64491 INJ PARAVERT F JNT C/T 2 LEV: CPT | Mod: 50

## 2024-04-15 NOTE — PROCEDURE
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  The risks and benefits of the procedure were discussed extensively with the patient.  The consent of the patient was obtained and the following procedure was performed. The patient was placed in the prone position on the fluoroscopy table and the area was prepped and draped in a sterile fashion.  A timeout was performed with all essential staff present and the site and side were verified.  The right C3, C4, and C5 vertebral levels were identified and the fluoroscope left obliqued to reveal the "waste" of the left articular pillars at the indicated levels.  Then under fluoroscopic guidance, a 25 gauge, 3.5 inch spinal needles were advanced to waist of the lateral processes of the right C3, C4, C5 nerves.  Proper placement of the needles was confirmed in the lateral fluoroscopic view.  There was negative aspiration for blood or CSF at each level.  Then 0.5 mL of a mixture of 2.5 mL of 0.25% Marcaine and 10 mg of Dexamethasone was injected at each nerve while meaningful verbal contact was maintained with the patient.  The procedure was performed in the exact same fashion on the contralateral left side at the C3, C4, and C5 levels.  The needle was subsequently removed.  Vital signs remained normal.  Pulse oximeter was used throughout the procedure and the patient's pulse and oxygen saturation remained within normal limits.  The patient tolerated the procedure well.  There were no complications.  The patient was instructed to apply ice over the injection sites for twenty minutes every two hours for the next 24 to 48 hours.  Disposition:       1. The patient was advised to F/U in 1-2 weeks to assess the response to the injection.       2. They were advised to keep a pain diary to report the results of the diagnostic block at their FUV.      3. The patient was also instructed to contact me immediately if there were any concerns related to the procedure performed.

## 2024-04-19 ENCOUNTER — RX RENEWAL (OUTPATIENT)
Age: 54
End: 2024-04-19

## 2024-04-30 ENCOUNTER — APPOINTMENT (OUTPATIENT)
Dept: PAIN MANAGEMENT | Facility: CLINIC | Age: 54
End: 2024-04-30
Payer: MEDICARE

## 2024-04-30 VITALS — WEIGHT: 165 LBS | BODY MASS INDEX: 29.23 KG/M2 | HEIGHT: 63 IN

## 2024-04-30 PROCEDURE — 99214 OFFICE O/P EST MOD 30 MIN: CPT

## 2024-05-01 NOTE — DISCUSSION/SUMMARY
[de-identified] : A discussion regarding available pain management treatment options occurred with the patient. These included interventional, rehabilitative, pharmacological, and alternative modalities. We will proceed with the following:    Interventional treatment options: 1. Proceed with a C7-T1 cervical epidural steroid injection with sedation.  Treatment options were discussed. The patient has been having persistent, severe neck pain and cervical radicular pain that has minimally improved with conservative therapy thus far (including physical therapy/chiropractic manipulations/home stretching and anti-inflammatory medications for 8 weeks. The patient was given the option of proceeding with a cervical epidural steroid injection to try to get the patient some pain relief.   The patient has severe anxiety of procedures that necessitates monitored anesthesia care (MAC). The procedure performed will be close to major nerves, arteries, and spinal cord and/or joint structures. Due to the proximity of these structures, we need the patient to be still during the procedure.  With the help of MAC, this will be safely achieved and decrease the risk of any complications.   Medications: 1. Continue with Meloxicam 15 mg to be taken nightly.   I advised the patient that the NSAID should be taken with food.  In addition while taking the prescribed NSAID, no over the counter or other NSAIDs should be used, such as ibuprofen (Motrin or Advil) or naproxen (Aleve) as this can cause stomach upset or other side effects.  If needed for fever or breakthrough pain Tylenol can be used.  - Follow up 2 weeks post injection.   I Eliza Hinds attest that this documentation has been prepared under the direction and in the presence of provider Dr. Shayne Jim.  The documentation recorded by the scribe in my presence, accurately reflects the service I personally performed, and the decisions made by me with my edits as appropriate.   Shayne Jim, DO

## 2024-05-01 NOTE — PHYSICAL EXAM
[de-identified] : C spine  No rashes, erythema, edema noted TTP b/l cervical paraspinal and trapezius  Limited ROM with neck extension and b/l left and right rotation 2/2 to pain Positive spurlings BL RUE: 5/5 strength, sensation in tact LUE: 5/5 strength, sensation in tact

## 2024-05-01 NOTE — HISTORY OF PRESENT ILLNESS
[FreeTextEntry1] : Mr. Mina is a 53-year-old male presenting with neck pain. He has a prior history of cervical spine C5-6 discectomy. He has significant stiffness in the neck. He has pain being referred upwards causing "headaches: or just pain in the head. He at times feels like he got hit in the back of the head. He has range of motion restrictions especially when extending the neck. He has significant pain when looking to the left. He cannot look over his shoulder when driving at all. He has been taking Tylenol to help with the pain. Pain is present daily and constant in nature. He has limitation of his ADLs. He denies any changes in bowel/bladder habits, fevers/chills or any recent weight loss.   TODAY, 4-: Revisit encounter.   Since his last visit, he underwent a Bilateral C3, C4, C5 medial branch block on 4-. He affords about 40% sustained relief from this procedure. He states he is able to rotate his neck more and his mobility has decreased. Over the last couple of days, he states his pain is rated at a 6/10 on the pain scale. He does continue with some ongoing stiffness however he is enrolled in physical therapy which helps. He has yet to get any cervicogenic headaches since the procedure. For the most part, his pain is now tolerable. He does note at times a sharp, pin prick like sensation on the top portion of his hands. He has to shake out his hands for the feeling to go away. He manages his pain with Meloxicam 15 mg nightly as needed.

## 2024-05-13 ENCOUNTER — APPOINTMENT (OUTPATIENT)
Dept: PAIN MANAGEMENT | Facility: CLINIC | Age: 54
End: 2024-05-13

## 2024-05-14 ENCOUNTER — APPOINTMENT (OUTPATIENT)
Dept: OTOLARYNGOLOGY | Facility: CLINIC | Age: 54
End: 2024-05-14
Payer: MEDICARE

## 2024-05-14 DIAGNOSIS — H90.3 SENSORINEURAL HEARING LOSS, BILATERAL: ICD-10-CM

## 2024-05-14 PROCEDURE — 92557 COMPREHENSIVE HEARING TEST: CPT

## 2024-05-14 PROCEDURE — 92504 EAR MICROSCOPY EXAMINATION: CPT

## 2024-05-14 PROCEDURE — 92550 TYMPANOMETRY & REFLEX THRESH: CPT | Mod: 52

## 2024-05-14 PROCEDURE — 99214 OFFICE O/P EST MOD 30 MIN: CPT

## 2024-05-14 NOTE — CONSULT LETTER
[Please see my note below.] : Please see my note below. [FreeTextEntry2] : Dear Dr Arun Drew [FreeTextEntry1] : Thank you for allowing me to participate in the care of LETI LOREDO . Please see the attached visit note.    [facility] Medical Director of [facility] Department of Otolaryngology [facility]

## 2024-05-14 NOTE — DATA REVIEWED
[de-identified] : In light of the patients current symptoms, Complete audiometry was ordered and completed today. I have interpreted these results and reviewed them in detail with the patient.  High-frequency hearing loss in the left ear persists [de-identified] : CT scan including images reviewed.  No visible fracture line in the right temporal bone.

## 2024-05-14 NOTE — ASSESSMENT
[FreeTextEntry1] : Perceived improvement following treatment for sudden hearing loss in the only-hearing left ear.  He feels that he is now up back to his baseline despite a persistent hearing loss visible on today's audiometry.  I have recommended follow-up with his audiologist to optimize hearing amplification.  I have recommended early follow-up with me if symptoms recur.  I have otherwise recommended routine follow-up with me for audiometric surveillance.

## 2024-05-14 NOTE — HISTORY OF PRESENT ILLNESS
[de-identified] : LETI LOREDO has a history of severe right hearing loss from a skull fracture in or about 2012, tinnitus in the right ear. Wearing hearing aids. States that his hearing muffled left ear about 2 weeks ago. Feels slightly muffled in left. Complaining of tinnitus at different pitches. Denies any obstruction or fluid in ears. Seen by Dr. Drew. Told it is possibly swelling due to medications. Audiogram 4/4/24. Improvement noted since evaluation in 4/4/24,  [FreeTextEntry1] : 05/14/2024 Patient returns today c/o SNHL, fracture of temporal bone sequela. States that he is hearing better after completing Prednisone. Denies pain or discomfort left ear. CT 4/17/24. Audiogram performed today. Here to discuss results.

## 2024-05-17 ENCOUNTER — APPOINTMENT (OUTPATIENT)
Dept: NEUROLOGY | Facility: CLINIC | Age: 54
End: 2024-05-17
Payer: MEDICARE

## 2024-05-17 ENCOUNTER — RX RENEWAL (OUTPATIENT)
Age: 54
End: 2024-05-17

## 2024-05-17 VITALS — HEIGHT: 63 IN | WEIGHT: 165 LBS | BODY MASS INDEX: 29.23 KG/M2

## 2024-05-17 DIAGNOSIS — R42 DIZZINESS AND GIDDINESS: ICD-10-CM

## 2024-05-17 DIAGNOSIS — M54.12 RADICULOPATHY, CERVICAL REGION: ICD-10-CM

## 2024-05-17 DIAGNOSIS — S06.9XAA UNSPECIFIED INTRACRANIAL INJURY WITH LOSS OF CONSCIOUSNESS STATUS UNKNOWN, INITIAL ENCOUNTER: ICD-10-CM

## 2024-05-17 DIAGNOSIS — G44.309 POST-TRAUMATIC HEADACHE, UNSPECIFIED, NOT INTRACTABLE: ICD-10-CM

## 2024-05-17 PROCEDURE — 99214 OFFICE O/P EST MOD 30 MIN: CPT

## 2024-05-17 RX ORDER — MECLIZINE HYDROCHLORIDE 25 MG/1
25 TABLET ORAL 3 TIMES DAILY
Qty: 90 | Refills: 2 | Status: ACTIVE | COMMUNITY
Start: 2023-12-15 | End: 1900-01-01

## 2024-05-17 RX ORDER — AMITRIPTYLINE HYDROCHLORIDE 100 MG/1
100 TABLET, FILM COATED ORAL
Qty: 30 | Refills: 3 | Status: ACTIVE | COMMUNITY
Start: 2023-12-15 | End: 1900-01-01

## 2024-05-17 RX ORDER — METHOCARBAMOL 750 MG/1
750 TABLET, FILM COATED ORAL
Qty: 30 | Refills: 1 | Status: ACTIVE | COMMUNITY
Start: 2024-03-20 | End: 1900-01-01

## 2024-05-17 RX ORDER — SUMATRIPTAN 100 MG/1
100 TABLET, FILM COATED ORAL
Qty: 9 | Refills: 5 | Status: ACTIVE | COMMUNITY
Start: 2023-12-15 | End: 1900-01-01

## 2024-05-17 NOTE — HISTORY OF PRESENT ILLNESS
[FreeTextEntry1] : Original Presentation ; Mr. 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 He was brought to the ED in  and CTH revealed and facial fractures and Xray of the wrist revealed a left wrist fracture. He was found to have a herniated cervical disc at C5 and subsequently had a discectomy. Patient has had neck and back pain since the accident. Patient has also had headaches on a regular basis since the accident. He awakens with the headache. He also complains of memory loss, dizziness, insomnia, word finding difficulty, and confusion. He was using Adderall for confusion and word finding difficulty, Fioricet for the headaches, and amitriptyline for the insomnia. Patient has decreased concentration and fear of driving. He is looking for a doctor to prescribe his medication as he has been unable to find one who takes his insurance. He has no results from his work up that was done 11 years ago. Patient has not worked since the accident and is not currently working.   Diagnostic Testing :  MRI Brain 11.3.23: Within normal limits  EEg 12.4.23 : normal  EMG [place] 10.27.23  : Normal  MRI Cervical Spine 2.11.24 :  Right sided disc protrusion C6-7 with marked proximal right foraminal compromise. Post surgical changes at C5-6 with mild right foraminal compromise. mild right foraminal compromise at C3-4.   Today : Today I had the pleasure of seeing Mr. Mina in our office for follow up. The patients previous history and physical findings have been reviewed.   Mr. Mina remains under our neurologic care for post concussive syndrome, chronic cervical radiculopathy, and post concussive headaches which began following a work related injury DOI 12.8.2012 (case now closed under his regular insurance).  Neuropsychological testing noted "insufficient evidence to establish a direct correlation between the current presenting symptoms and the head injury that occurred over 13 years ago. Nonetheless, the lingering physical consequences stemming from the fall, leading to persistent pain and requiring multiple surgeries, provide a plausible explanation for the observed compromised processing and motor functions in the patient" and patient was recommended "psychotherapy with a dynamic therapeutic approach such as ACT, CBT, Motivational Interview, and/or Solution Focused to address pain and mood symptoms related to his health and adjustment as well as living with disability."      Regarding his chronic cervical radiculopathy patient is C5 discectomy preformed by Dr. Leonardo at [facility] following his injury in 2012. He rates his neck pain a 6-7/10 associated with weakness to bilateral upper extremities and weak hand .  He has participated in physical therapy (x4-6wks) with only mild improvement of his neck pain and notes no change in his upper extremity strength or range of motion. MRI of the Cervical spine reviewed at last visit. Since his last visit he has been under he care of pain management last seen 5.1.24 and is s/p cervical nerve block. Request for a C7-T1 cervical epidural steroid injection with sedation. Patient scheduled for f/u with Dr. Jim in 2 weeks. Patient is happy to report improvement in his neck pain and ROM following injection.         With respect to his chronic headaches and migraines which alternate between tension type headaches likely cervicogenic and common migraines with dizziness, he remains on a medication regimen of Amitriptyline 100mg once at night for migraine prophylaxis, Sumatriptan 100mg PRN as abortive therapy for acute migraines and Meclizine 25mg PRN. He denies any adverse side effects from these medications and states they work well to reduce his headache episodes and relieve his symptoms if they do occur.

## 2024-05-17 NOTE — PHYSICAL EXAM
[General Appearance - Alert] : alert [General Appearance - In No Acute Distress] : in no acute distress [Oriented To Time, Place, And Person] : oriented to person, place, and time [Impaired Insight] : insight and judgment were intact [Affect] : the affect was normal [Mood] : the mood was normal [Person] : oriented to person [Place] : oriented to place [Time] : oriented to time [Remote Intact] : remote memory intact [Visual Intact] : visual attention was ~T not ~L decreased [Naming Objects] : no difficulty naming common objects [Repeating Phrases] : no difficulty repeating a phrase [Writing A Sentence] : no difficulty writing a sentence [Comprehension] : comprehension intact [Reading] : reading intact [Past History] : adequate knowledge of personal past history [Cranial Nerves Optic (II)] : visual acuity intact bilaterally,  visual fields full to confrontation, pupils equal round and reactive to light [Cranial Nerves Oculomotor (III)] : extraocular motion intact [Cranial Nerves Trigeminal (V)] : facial sensation intact symmetrically [Cranial Nerves Facial (VII)] : face symmetrical [Cranial Nerves Vestibulocochlear (VIII)] : hearing was intact bilaterally [Cranial Nerves Glossopharyngeal (IX)] : tongue and palate midline [Cranial Nerves Accessory (XI - Cranial And Spinal)] : head turning and shoulder shrug symmetric [Motor Tone] : muscle tone was normal in all four extremities [Cranial Nerves Hypoglossal (XII)] : there was no tongue deviation with protrusion [Motor Strength] : muscle strength was normal in all four extremities [No Muscle Atrophy] : normal bulk in all four extremities [Motor Strength Upper Extremities Bilaterally] : there was weakness in both upper extremities [Sensation Tactile Decrease] : light touch was intact [Balance] : balance was intact [2+] : Ankle jerk left 2+ [PERRL With Normal Accommodation] : pupils were equal in size, round, reactive to light, with normal accommodation [Extraocular Movements] : extraocular movements were intact [Hearing Threshold Finger Rub Not [place]] : hearing was normal [Abnormal Walk] : normal gait [Involuntary Movements] : no involuntary movements were seen [Short Term Intact] : short term memory impaired [Concentration Intact] : a decrease in concentrating ability was observed [Fluency] : fluency not intact [Motor Strength Lower Extremities Bilaterally] : strength was normal in both lower extremities [Past-pointing] : there was no past-pointing [Tremor] : no tremor present [Coordination - Dysmetria Impaired Finger-to-Nose Bilateral] : not present [Plantar Reflex Right Only] : normal on the right [Plantar Reflex Left Only] : normal on the left [FreeTextEntry6] : Right hand  4-/5 Left hand  5/5  RUE 4-/5 bicep flexion / triceps extension LUE 4-/5 triceps [FreeTextEntry1] : Limited ROM and cerico-occipital tenderness

## 2024-05-17 NOTE — ASSESSMENT
[FreeTextEntry1] : 53 year old male with chronic cervical radiculopathy s/p C5 discectomy at [facility] following in 2012.  and chronic headaches and dizziness following a work related accident in 2012. Regarding his neck pain patient is now under the care of pain management, last seen 5.1.24 and is s/p cervical nerve block. Request for a C7-T1 cervical epidural steroid injection with sedation was denied by insurance.  Patient scheduled for f/u with Dr. Jim in 2 weeks. His  chronic headaches and migraines remain stable on his regime of  Amitriptyline 100mg once at night for migraine prophylaxis, Sumatriptan 100mg PRN as abortive therapy for acute migraines and Meclizine 25mg PRN. The above medications we renewed today as is without change. He will return to the office in 4 months for re-evaluation. Patient is aware that they may call/ contact the office at any time if they have any additional questions or concerns regarding their management. All potential risks, benefits, side effects and interactions of any medications prescribed have been discussed in detail with the patient.   Supervising Physician : Tejinder Parsons MD

## 2024-05-29 ENCOUNTER — APPOINTMENT (OUTPATIENT)
Dept: PAIN MANAGEMENT | Facility: CLINIC | Age: 54
End: 2024-05-29
Payer: MEDICARE

## 2024-05-29 VITALS — WEIGHT: 165 LBS | HEIGHT: 63 IN | BODY MASS INDEX: 29.23 KG/M2

## 2024-05-29 DIAGNOSIS — M47.812 SPONDYLOSIS W/OUT MYELOPATHY OR RADICULOPATHY, CERVICAL REGION: ICD-10-CM

## 2024-05-29 DIAGNOSIS — M54.12 RADICULOPATHY, CERVICAL REGION: ICD-10-CM

## 2024-05-29 PROCEDURE — 99214 OFFICE O/P EST MOD 30 MIN: CPT

## 2024-05-29 PROCEDURE — 99204 OFFICE O/P NEW MOD 45 MIN: CPT

## 2024-05-29 RX ORDER — NAPROXEN 500 MG/1
500 TABLET ORAL
Qty: 60 | Refills: 0 | Status: ACTIVE | COMMUNITY
Start: 2024-05-29 | End: 1900-01-01

## 2024-05-29 RX ORDER — MELOXICAM 15 MG/1
15 TABLET ORAL
Qty: 30 | Refills: 0 | Status: DISCONTINUED | COMMUNITY
Start: 2024-03-20 | End: 2024-05-29

## 2024-05-31 PROBLEM — M47.812 CERVICAL SPONDYLOSIS: Status: ACTIVE | Noted: 2024-03-20

## 2024-05-31 PROBLEM — M54.12 CERVICAL RADICULITIS: Status: ACTIVE | Noted: 2024-04-30

## 2024-05-31 NOTE — PHYSICAL EXAM
[de-identified] : C spine  No rashes, erythema, edema noted TTP b/l cervical paraspinal and trapezius  Limited ROM with neck extension and b/l left and right rotation 2/2 to pain Positive spurlings BL RUE: 5/5 strength, sensation in tact LUE: 5/5 strength, sensation in tact

## 2024-05-31 NOTE — DISCUSSION/SUMMARY
[de-identified] : A discussion regarding available pain management treatment options occurred with the patient. These included interventional, rehabilitative, pharmacological, and alternative modalities. We will proceed with the following:    Interventional treatment options: 1. Proceed with a C7-T1 cervical epidural steroid injection without sedation.  Treatment options were discussed. The patient has been having persistent, severe neck pain and cervical radicular pain that has minimally improved with conservative therapy thus far (including physical therapy/chiropractic manipulations/home stretching and anti-inflammatory medications for 8 weeks. The patient was given the option of proceeding with a cervical epidural steroid injection to try to get the patient some pain relief.   Medications: 1. Ordered Naproxen 500 mg q12h as needed.  I advised the patient that the NSAID should be taken with food.  In addition while taking the prescribed NSAID, no over the counter or other NSAIDs should be used, such as ibuprofen (Motrin or Advil) or naproxen (Aleve) as this can cause stomach upset or other side effects.  If needed for fever or breakthrough pain Tylenol can be used.   * I advised the patient to stop using these 5 days prior to epidural   - Follow up 2 weeks post injection.   I Eliza Hinds attest that this documentation has been prepared under the direction and in the presence of provider Dr. Shayne Jim.  The documentation recorded by the scribe in my presence, accurately reflects the service I personally performed, and the decisions made by me with my edits as appropriate.   Shayne Jim, DO

## 2024-05-31 NOTE — HISTORY OF PRESENT ILLNESS
[FreeTextEntry1] : Mr. Mina is a 53-year-old male presenting with neck pain. He has a prior history of cervical spine C5-6 discectomy. He has significant stiffness in the neck. He has pain being referred upwards causing "headaches: or just pain in the head. He at times feels like he got hit in the back of the head. He has range of motion restrictions especially when extending the neck. He has significant pain when looking to the left. He cannot look over his shoulder when driving at all. He has been taking Tylenol to help with the pain. Pain is present daily and constant in nature. He has limitation of his ADLs. He denies any changes in bowel/bladder habits, fevers/chills or any recent weight loss.   TODAY, 5-: Revisit encounter.   Since his last visit, he underwent a Bilateral C3, C4, C5 medial branch block on 4-. He affords about 40% sustained relief from this procedure. He states he is able to rotate his neck more and his mobility has decreased. Over the last couple of days, he states his pain is rated at a 6/10 on the pain scale. He does continue with some ongoing stiffness however he is enrolled in physical therapy which helps. He has yet to get any cervicogenic headaches since the procedure. For the most part, his pain is now tolerable. He does note at times a sharp, pin prick like sensation on the top portion of his hands. He has to shake out his hands for the feeling to go away. He manages his pain with Meloxicam 15 mg nightly as needed. He has been managing his pain with Meloxicam 15 mg.

## 2024-07-17 ENCOUNTER — INPATIENT (INPATIENT)
Facility: HOSPITAL | Age: 54
LOS: 1 days | Discharge: HOME | DRG: 948 | End: 2024-07-19
Attending: INTERNAL MEDICINE | Admitting: STUDENT IN AN ORGANIZED HEALTH CARE EDUCATION/TRAINING PROGRAM
Payer: MEDICARE

## 2024-07-17 VITALS
HEART RATE: 111 BPM | TEMPERATURE: 97 F | OXYGEN SATURATION: 96 % | DIASTOLIC BLOOD PRESSURE: 73 MMHG | SYSTOLIC BLOOD PRESSURE: 137 MMHG | RESPIRATION RATE: 18 BRPM

## 2024-07-17 DIAGNOSIS — Z98.890 OTHER SPECIFIED POSTPROCEDURAL STATES: Chronic | ICD-10-CM

## 2024-07-17 DIAGNOSIS — T43.014A POISONING BY TRICYCLIC ANTIDEPRESSANTS, UNDETERMINED, INITIAL ENCOUNTER: ICD-10-CM

## 2024-07-17 DIAGNOSIS — R41.82 ALTERED MENTAL STATUS, UNSPECIFIED: ICD-10-CM

## 2024-07-17 LAB
ALBUMIN SERPL ELPH-MCNC: 4.4 G/DL — SIGNIFICANT CHANGE UP (ref 3.5–5.2)
ALP SERPL-CCNC: 78 U/L — SIGNIFICANT CHANGE UP (ref 30–115)
ALT FLD-CCNC: 22 U/L — SIGNIFICANT CHANGE UP (ref 0–41)
ANION GAP SERPL CALC-SCNC: 13 MMOL/L — SIGNIFICANT CHANGE UP (ref 7–14)
APAP SERPL-MCNC: <5 UG/ML — LOW (ref 10–30)
APTT BLD: 29.7 SEC — SIGNIFICANT CHANGE UP (ref 27–39.2)
AST SERPL-CCNC: 22 U/L — SIGNIFICANT CHANGE UP (ref 0–41)
BASOPHILS # BLD AUTO: 0.06 K/UL — SIGNIFICANT CHANGE UP (ref 0–0.2)
BASOPHILS NFR BLD AUTO: 0.6 % — SIGNIFICANT CHANGE UP (ref 0–1)
BILIRUB SERPL-MCNC: 0.2 MG/DL — SIGNIFICANT CHANGE UP (ref 0.2–1.2)
BUN SERPL-MCNC: 17 MG/DL — SIGNIFICANT CHANGE UP (ref 10–20)
CALCIUM SERPL-MCNC: 9.6 MG/DL — SIGNIFICANT CHANGE UP (ref 8.4–10.5)
CHLORIDE SERPL-SCNC: 102 MMOL/L — SIGNIFICANT CHANGE UP (ref 98–110)
CO2 SERPL-SCNC: 25 MMOL/L — SIGNIFICANT CHANGE UP (ref 17–32)
CREAT SERPL-MCNC: 1 MG/DL — SIGNIFICANT CHANGE UP (ref 0.7–1.5)
DRUG SCREEN 1, URINE RESULT: SIGNIFICANT CHANGE UP
EGFR: 89 ML/MIN/1.73M2 — SIGNIFICANT CHANGE UP
EOSINOPHIL # BLD AUTO: 0.41 K/UL — SIGNIFICANT CHANGE UP (ref 0–0.7)
EOSINOPHIL NFR BLD AUTO: 3.8 % — SIGNIFICANT CHANGE UP (ref 0–8)
ETHANOL SERPL-MCNC: <10 MG/DL — SIGNIFICANT CHANGE UP
FLUAV AG NPH QL: SIGNIFICANT CHANGE UP
FLUBV AG NPH QL: SIGNIFICANT CHANGE UP
GLUCOSE BLDC GLUCOMTR-MCNC: 101 MG/DL — HIGH (ref 70–99)
GLUCOSE BLDC GLUCOMTR-MCNC: 106 MG/DL — HIGH (ref 70–99)
GLUCOSE SERPL-MCNC: 114 MG/DL — HIGH (ref 70–99)
HCT VFR BLD CALC: 46.7 % — SIGNIFICANT CHANGE UP (ref 42–52)
HGB BLD-MCNC: 16.1 G/DL — SIGNIFICANT CHANGE UP (ref 14–18)
IMM GRANULOCYTES NFR BLD AUTO: 0.6 % — HIGH (ref 0.1–0.3)
INR BLD: 0.97 RATIO — SIGNIFICANT CHANGE UP (ref 0.65–1.3)
LYMPHOCYTES # BLD AUTO: 2.6 K/UL — SIGNIFICANT CHANGE UP (ref 1.2–3.4)
LYMPHOCYTES # BLD AUTO: 24 % — SIGNIFICANT CHANGE UP (ref 20.5–51.1)
MCHC RBC-ENTMCNC: 33.2 PG — HIGH (ref 27–31)
MCHC RBC-ENTMCNC: 34.5 G/DL — SIGNIFICANT CHANGE UP (ref 32–37)
MCV RBC AUTO: 96.3 FL — HIGH (ref 80–94)
MONOCYTES # BLD AUTO: 0.75 K/UL — HIGH (ref 0.1–0.6)
MONOCYTES NFR BLD AUTO: 6.9 % — SIGNIFICANT CHANGE UP (ref 1.7–9.3)
NEUTROPHILS # BLD AUTO: 6.96 K/UL — HIGH (ref 1.4–6.5)
NEUTROPHILS NFR BLD AUTO: 64.1 % — SIGNIFICANT CHANGE UP (ref 42.2–75.2)
NRBC # BLD: 0 /100 WBCS — SIGNIFICANT CHANGE UP (ref 0–0)
PLATELET # BLD AUTO: 236 K/UL — SIGNIFICANT CHANGE UP (ref 130–400)
PMV BLD: 10 FL — SIGNIFICANT CHANGE UP (ref 7.4–10.4)
POTASSIUM SERPL-MCNC: 4.2 MMOL/L — SIGNIFICANT CHANGE UP (ref 3.5–5)
POTASSIUM SERPL-SCNC: 4.2 MMOL/L — SIGNIFICANT CHANGE UP (ref 3.5–5)
PROT SERPL-MCNC: 6.8 G/DL — SIGNIFICANT CHANGE UP (ref 6–8)
PROTHROM AB SERPL-ACNC: 11 SEC — SIGNIFICANT CHANGE UP (ref 9.95–12.87)
RBC # BLD: 4.85 M/UL — SIGNIFICANT CHANGE UP (ref 4.7–6.1)
RBC # FLD: 12.8 % — SIGNIFICANT CHANGE UP (ref 11.5–14.5)
RSV RNA NPH QL NAA+NON-PROBE: SIGNIFICANT CHANGE UP
SALICYLATES SERPL-MCNC: <0.3 MG/DL — LOW (ref 4–30)
SARS-COV-2 RNA SPEC QL NAA+PROBE: SIGNIFICANT CHANGE UP
SODIUM SERPL-SCNC: 140 MMOL/L — SIGNIFICANT CHANGE UP (ref 135–146)
TROPONIN T, HIGH SENSITIVITY RESULT: 9 NG/L — SIGNIFICANT CHANGE UP (ref 6–21)
WBC # BLD: 10.84 K/UL — HIGH (ref 4.8–10.8)
WBC # FLD AUTO: 10.84 K/UL — HIGH (ref 4.8–10.8)

## 2024-07-17 PROCEDURE — 0042T: CPT | Mod: MC

## 2024-07-17 PROCEDURE — 71045 X-RAY EXAM CHEST 1 VIEW: CPT | Mod: 26

## 2024-07-17 PROCEDURE — 93005 ELECTROCARDIOGRAM TRACING: CPT

## 2024-07-17 PROCEDURE — 84100 ASSAY OF PHOSPHORUS: CPT

## 2024-07-17 PROCEDURE — 36415 COLL VENOUS BLD VENIPUNCTURE: CPT

## 2024-07-17 PROCEDURE — 99223 1ST HOSP IP/OBS HIGH 75: CPT | Mod: GC

## 2024-07-17 PROCEDURE — 83036 HEMOGLOBIN GLYCOSYLATED A1C: CPT

## 2024-07-17 PROCEDURE — 92610 EVALUATE SWALLOWING FUNCTION: CPT | Mod: GN

## 2024-07-17 PROCEDURE — 70498 CT ANGIOGRAPHY NECK: CPT | Mod: 26,MC

## 2024-07-17 PROCEDURE — 70496 CT ANGIOGRAPHY HEAD: CPT | Mod: 26,MC

## 2024-07-17 PROCEDURE — 93010 ELECTROCARDIOGRAM REPORT: CPT

## 2024-07-17 PROCEDURE — 85025 COMPLETE CBC W/AUTO DIFF WBC: CPT

## 2024-07-17 PROCEDURE — 82962 GLUCOSE BLOOD TEST: CPT

## 2024-07-17 PROCEDURE — 99223 1ST HOSP IP/OBS HIGH 75: CPT

## 2024-07-17 PROCEDURE — 93010 ELECTROCARDIOGRAM REPORT: CPT | Mod: 77

## 2024-07-17 PROCEDURE — 71045 X-RAY EXAM CHEST 1 VIEW: CPT

## 2024-07-17 PROCEDURE — 99291 CRITICAL CARE FIRST HOUR: CPT

## 2024-07-17 PROCEDURE — 80053 COMPREHEN METABOLIC PANEL: CPT

## 2024-07-17 PROCEDURE — 70450 CT HEAD/BRAIN W/O DYE: CPT | Mod: 26,MC,XU

## 2024-07-17 PROCEDURE — 83735 ASSAY OF MAGNESIUM: CPT

## 2024-07-17 RX ORDER — DEXTROSE 30 % IN WATER 30 %
25 VIAL (ML) INTRAVENOUS ONCE
Refills: 0 | Status: DISCONTINUED | OUTPATIENT
Start: 2024-07-17 | End: 2024-07-19

## 2024-07-17 RX ORDER — SODIUM CHLORIDE 0.9 % (FLUSH) 0.9 %
1000 SYRINGE (ML) INJECTION
Refills: 0 | Status: DISCONTINUED | OUTPATIENT
Start: 2024-07-17 | End: 2024-07-19

## 2024-07-17 RX ORDER — DEXTROSE MONOHYDRATE AND SODIUM CHLORIDE 5; .3 G/100ML; G/100ML
1000 INJECTION, SOLUTION INTRAVENOUS
Refills: 0 | Status: DISCONTINUED | OUTPATIENT
Start: 2024-07-17 | End: 2024-07-19

## 2024-07-17 RX ORDER — GLUCAGON HYDROCHLORIDE 1 MG/ML
1 INJECTION, POWDER, FOR SOLUTION INTRAMUSCULAR; INTRAVENOUS; SUBCUTANEOUS ONCE
Refills: 0 | Status: DISCONTINUED | OUTPATIENT
Start: 2024-07-17 | End: 2024-07-19

## 2024-07-17 RX ORDER — DEXTROSE 30 % IN WATER 30 %
15 VIAL (ML) INTRAVENOUS ONCE
Refills: 0 | Status: DISCONTINUED | OUTPATIENT
Start: 2024-07-17 | End: 2024-07-19

## 2024-07-17 RX ORDER — DEXTROSE 30 % IN WATER 30 %
12.5 VIAL (ML) INTRAVENOUS ONCE
Refills: 0 | Status: DISCONTINUED | OUTPATIENT
Start: 2024-07-17 | End: 2024-07-19

## 2024-07-17 RX ORDER — SODIUM BICARBONATE 650 MG/1
100 TABLET ORAL ONCE
Refills: 0 | Status: COMPLETED | OUTPATIENT
Start: 2024-07-17 | End: 2024-07-17

## 2024-07-17 RX ORDER — MECLIZINE HCL 25 MG
25 TABLET ORAL THREE TIMES A DAY
Refills: 0 | Status: DISCONTINUED | OUTPATIENT
Start: 2024-07-17 | End: 2024-07-19

## 2024-07-17 RX ORDER — INSULIN LISPRO 100 [IU]/ML
INJECTION, SOLUTION SUBCUTANEOUS
Refills: 0 | Status: DISCONTINUED | OUTPATIENT
Start: 2024-07-17 | End: 2024-07-19

## 2024-07-17 RX ORDER — AMITRIPTYLINE HCL 10 MG
100 TABLET ORAL
Refills: 0 | DISCHARGE

## 2024-07-17 RX ORDER — METHOCARBAMOL 500 MG
1 TABLET ORAL
Refills: 0 | DISCHARGE

## 2024-07-17 RX ORDER — METHOCARBAMOL 500 MG
750 TABLET ORAL AT BEDTIME
Refills: 0 | Status: DISCONTINUED | OUTPATIENT
Start: 2024-07-17 | End: 2024-07-19

## 2024-07-17 RX ORDER — ENOXAPARIN SODIUM 100 MG/ML
40 INJECTION SUBCUTANEOUS EVERY 24 HOURS
Refills: 0 | Status: DISCONTINUED | OUTPATIENT
Start: 2024-07-17 | End: 2024-07-19

## 2024-07-17 RX ORDER — DEXTROSE MONOHYDRATE AND SODIUM CHLORIDE 5; .3 G/100ML; G/100ML
1000 INJECTION, SOLUTION INTRAVENOUS ONCE
Refills: 0 | Status: COMPLETED | OUTPATIENT
Start: 2024-07-17 | End: 2024-07-17

## 2024-07-17 RX ORDER — SODIUM BICARBONATE 650 MG/1
150 TABLET ORAL ONCE
Refills: 0 | Status: DISCONTINUED | OUTPATIENT
Start: 2024-07-17 | End: 2024-07-17

## 2024-07-17 RX ORDER — SUMATRIPTAN SUCCINATE 25 MG/1
100 TABLET, FILM COATED ORAL DAILY
Refills: 0 | Status: DISCONTINUED | OUTPATIENT
Start: 2024-07-17 | End: 2024-07-19

## 2024-07-17 RX ORDER — MECLIZINE HCL 25 MG
1 TABLET ORAL
Refills: 0 | DISCHARGE

## 2024-07-17 RX ADMIN — ENOXAPARIN SODIUM 40 MILLIGRAM(S): 100 INJECTION SUBCUTANEOUS at 18:16

## 2024-07-17 RX ADMIN — SODIUM BICARBONATE 100 MILLIEQUIVALENT(S): 650 TABLET ORAL at 18:16

## 2024-07-17 RX ADMIN — DEXTROSE MONOHYDRATE AND SODIUM CHLORIDE 1000 MILLILITER(S): 5; .3 INJECTION, SOLUTION INTRAVENOUS at 10:30

## 2024-07-17 RX ADMIN — Medication 50 MILLILITER(S): at 18:19

## 2024-07-17 RX ADMIN — DEXTROSE MONOHYDRATE AND SODIUM CHLORIDE 1000 MILLILITER(S): 5; .3 INJECTION, SOLUTION INTRAVENOUS at 09:21

## 2024-07-17 NOTE — CONSULT NOTE ADULT - SUBJECTIVE AND OBJECTIVE BOX
Neurology Consult    Patient is a 53y old  Male who presents with a chief complaint of AMS, slurred speech     HPI: 53-year-old male past medical history TBI, headaches, amitriptyline, presents altered mental status and stroke code this morning.  Per wife patient last known well 10 PM yesterday.  Wife states around 430–5 AM today patient was acting erratically, scratching at walls, trying to get out of bed. Pt fell and she put him back in bed. Pt slept until 7 am when he woke up confused, generally weak and slurred speech, only able to say his name, so she called 911.  Wife states patient has been more somnolent and sleepy en route and in the ED.  Patient noncontributory to history secondary to mental status.  Wife states patient sometimes takes excess of amitriptyline, but not intentionally. He often forgets if he takes it.  Never had symptoms like this.   Wife states no other known ingestions.    Post CTH patient was unable to be aroused, snoring, not moving any extremities.     PAST MEDICAL & SURGICAL HISTORY:      FAMILY HISTORY:      Social History: (-) x 3    Allergies    No Known Allergies    Intolerances        MEDICATIONS  (STANDING):    MEDICATIONS  (PRN):      Review of systems:    Constitutional: as per HPI  Neurological: As per HPI      Vital Signs Last 24 Hrs  T(C): 36.3 (17 Jul 2024 09:30), Max: 36.4 (17 Jul 2024 08:40)  T(F): 97.4 (17 Jul 2024 09:30), Max: 97.5 (17 Jul 2024 08:40)  HR: 108 (17 Jul 2024 09:30) (108 - 118)  BP: 148/86 (17 Jul 2024 09:30) (127/86 - 159/78)  BP(mean): --  RR: 15 (17 Jul 2024 09:30) (14 - 18)  SpO2: 97% (17 Jul 2024 09:30) (93% - 97%)    Parameters below as of 17 Jul 2024 09:30  Patient On (Oxygen Delivery Method): nasal cannula  O2 Flow (L/min): 3      Examination:  General:  not able to be aroused, snoring, not responding to noxious stimuli, not moving any extremities     Labs:   CBC Full  -  ( 17 Jul 2024 08:20 )  WBC Count : 10.84 K/uL  RBC Count : 4.85 M/uL  Hemoglobin : 16.1 g/dL  Hematocrit : 46.7 %  Platelet Count - Automated : 236 K/uL  Mean Cell Volume : 96.3 fL  Mean Cell Hemoglobin : 33.2 pg  Mean Cell Hemoglobin Concentration : 34.5 g/dL  Auto Neutrophil # : 6.96 K/uL  Auto Lymphocyte # : 2.60 K/uL  Auto Monocyte # : 0.75 K/uL  Auto Eosinophil # : 0.41 K/uL  Auto Basophil # : 0.06 K/uL  Auto Neutrophil % : 64.1 %  Auto Lymphocyte % : 24.0 %  Auto Monocyte % : 6.9 %  Auto Eosinophil % : 3.8 %  Auto Basophil % : 0.6 %    07-17    140  |  102  |  17  ----------------------------<  114<H>  4.2   |  25  |  1.0    Ca    9.6      17 Jul 2024 08:20    TPro  6.8  /  Alb  4.4  /  TBili  0.2  /  DBili  x   /  AST  22  /  ALT  22  /  AlkPhos  78  07-17    LIVER FUNCTIONS - ( 17 Jul 2024 08:20 )  Alb: 4.4 g/dL / Pro: 6.8 g/dL / ALK PHOS: 78 U/L / ALT: 22 U/L / AST: 22 U/L / GGT: x           PT/INR - ( 17 Jul 2024 08:20 )   PT: 11.00 sec;   INR: 0.97 ratio         PTT - ( 17 Jul 2024 08:20 )  PTT:29.7 sec  Urinalysis Basic - ( 17 Jul 2024 08:20 )    Color: x / Appearance: x / SG: x / pH: x  Gluc: 114 mg/dL / Ketone: x  / Bili: x / Urobili: x   Blood: x / Protein: x / Nitrite: x   Leuk Esterase: x / RBC: x / WBC x   Sq Epi: x / Non Sq Epi: x / Bacteria: x          Neuroimaging:  NCT:     07-17-24 @ 10:00    < from: CT Brain Stroke Protocol (07.17.24 @ 08:23) >    IMPRESSION:    No acute intracranial pathology. Further evaluation with MRI can be   considered if the symptoms persist (if no contraindication).    These findings were discussed with Trevon GUERRA at 7/17/2024 8:31 AM by Dr. Alba with read back confirmation.    < end of copied text >      < from: CT Angio Neck Stroke Protocol w/ IV Cont (07.17.24 @ 08:40) >  IMPRESSION:    CT PERFUSION:  No perfusion defect using standard threshold.    CTA HEAD/NECK:  No large vessel occlusion, stenosis, aneurysm, or vascular malformation.    Prominent bilateral palatine tonsils. Mildly enlarged right level 2A and   bilateral level 5 lymph nodes, nonspecific in etiology. Recommend   nonemergent follow-up with ENT.    < end of copied text >

## 2024-07-17 NOTE — CONSULT NOTE ADULT - NS ATTEND AMEND GEN_ALL_CORE FT
Patient discussed and seen with the resident. All pertinent labs, medications, images, notes, vitals reviewed personally. I agree with the above assessment and plan, which I have reviewed and edited.
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
## 2024-07-17 NOTE — ED ADULT NURSE NOTE - PLAN OF CARE
Call bell/Explanation of exam/test/Fall precautions/Bedside visitors/I and O/NPO/Position of comfort/Seizure precautions/Side rails

## 2024-07-17 NOTE — CONSULT NOTE ADULT - SUBJECTIVE AND OBJECTIVE BOX
Patient is a 53y old  Male who presents with a chief complaint of AMS    HPI: 53-year-old male past medical history TBI, headaches, amitriptyline, presents altered mental status and stroke code this morning.  Per wife patient last known well 10 PM yesterday.  Wife states around 430–5 AM today patient was acting erratically, scratching at walls, trying to get out of bed. Pt fell and she put him back in bed. Pt slept until 7 am when he woke up confused, generally weak and slurred speech, only able to say his name, so she called 911.  Wife states patient has been more somnolent and sleepy en route and in the ED.  Patient noncontributory to history secondary to mental status.  Wife states patient sometimes takes excess of amitriptyline, but not intentionally. He often forgets if he takes it.  Never had symptoms like this.   Wife states no other known ingestions.      PAST MEDICAL & SURGICAL HISTORY:  TBI (traumatic brain injury)      Chronic headaches      Current smoker          SOCIAL HX:   Smoking                         ETOH                            Other    FAMILY HISTORY:  :  No known cardiovacular family hisotry     Review Of Systems:     All ROS are negative except per HPI       Allergies    No Known Allergies    Intolerances          PHYSICAL EXAM    ICU Vital Signs Last 24 Hrs  T(C): 36.1 (17 Jul 2024 10:00), Max: 36.4 (17 Jul 2024 08:40)  T(F): 97 (17 Jul 2024 10:00), Max: 97.5 (17 Jul 2024 08:40)  HR: 105 (17 Jul 2024 10:30) (105 - 118)  BP: 160/86 (17 Jul 2024 10:30) (127/86 - 161/76)  BP(mean): --  ABP: --  ABP(mean): --  RR: 16 (17 Jul 2024 10:30) (14 - 18)  SpO2: 97% (17 Jul 2024 10:30) (93% - 97%)    O2 Parameters below as of 17 Jul 2024 10:30  Patient On (Oxygen Delivery Method): 2LPM nasal cannula  O2 Flow (L/min): 2          CONSTITUTIONAL:  NAD    ENT:   Airway patent,   Mouth with normal mucosa.   No thrush    CARDIAC:   Normal rate,   Regular rhythm.    No edema    RESPIRATORY:   No wheezing  Bilateral BS   Not tachypneic,  No use of accessory muscles    GASTROINTESTINAL:  Abdomen soft,   Non-tender,   No guarding,   + BS    NEUROLOGICAL:   Alert and oriented   No motor deficits.    SKIN:   Skin normal color for race,   No evidence of rash.            07-17-24 @ 07:01  -  07-17-24 @ 11:05  --------------------------------------------------------  IN:    Lactated Ringers Bolus: 1000 mL  Total IN: 1000 mL    OUT:    Intermittent Catheterization - Urethral (mL): 150 mL  Total OUT: 150 mL    Total NET: 850 mL          LABS:                          16.1   10.84 )-----------( 236      ( 17 Jul 2024 08:20 )             46.7                                               07-17    140  |  102  |  17  ----------------------------<  114<H>  4.2   |  25  |  1.0    Ca    9.6      17 Jul 2024 08:20    TPro  6.8  /  Alb  4.4  /  TBili  0.2  /  DBili  x   /  AST  22  /  ALT  22  /  AlkPhos  78  07-17      PT/INR - ( 17 Jul 2024 08:20 )   PT: 11.00 sec;   INR: 0.97 ratio         PTT - ( 17 Jul 2024 08:20 )  PTT:29.7 sec                                       Urinalysis Basic - ( 17 Jul 2024 08:20 )    Color: x / Appearance: x / SG: x / pH: x  Gluc: 114 mg/dL / Ketone: x  / Bili: x / Urobili: x   Blood: x / Protein: x / Nitrite: x   Leuk Esterase: x / RBC: x / WBC x   Sq Epi: x / Non Sq Epi: x / Bacteria: x                                                  LIVER FUNCTIONS - ( 17 Jul 2024 08:20 )  Alb: 4.4 g/dL / Pro: 6.8 g/dL / ALK PHOS: 78 U/L / ALT: 22 U/L / AST: 22 U/L / GGT: x                                                                                                                                       X-Rays reviewed                                                                                     ECHO    CXR interpreted by me     MEDICATIONS  (STANDING):    MEDICATIONS  (PRN):         Patient is a 53y old  Male who presents with a chief complaint of AMS    HPI: 53-year-old male past medical history TBI, headaches, amitriptyline, presents altered mental status and stroke code this morning.  Per wife patient last known well 10 PM yesterday.  Wife states around 430–5 AM today patient was acting erratically, scratching at walls, trying to get out of bed. Pt fell and she put him back in bed. Pt slept until 7 am when he woke up confused, generally weak and slurred speech, only able to say his name, so she called 911.  Wife states patient has been more somnolent and sleepy en route and in the ED.  Patient noncontributory to history secondary to mental status.  Wife states patient sometimes takes excess of amitriptyline, but not intentionally. He often forgets if he takes it.  Never had symptoms like this.   Wife states no other known ingestions.      PAST MEDICAL & SURGICAL HISTORY:  TBI (traumatic brain injury)      Chronic headaches      Current smoker          SOCIAL HX:   Smoking        Active, half a pack                 ETOH     Social                       Other    FAMILY HISTORY:  :  No known cardiovacular family hisotry     Review Of Systems:     All ROS are negative except per HPI       Allergies    No Known Allergies    Intolerances          PHYSICAL EXAM    ICU Vital Signs Last 24 Hrs  T(C): 36.1 (17 Jul 2024 10:00), Max: 36.4 (17 Jul 2024 08:40)  T(F): 97 (17 Jul 2024 10:00), Max: 97.5 (17 Jul 2024 08:40)  HR: 105 (17 Jul 2024 10:30) (105 - 118)  BP: 160/86 (17 Jul 2024 10:30) (127/86 - 161/76)  BP(mean): --  ABP: --  ABP(mean): --  RR: 16 (17 Jul 2024 10:30) (14 - 18)  SpO2: 97% (17 Jul 2024 10:30) (93% - 97%)    O2 Parameters below as of 17 Jul 2024 10:30  Patient On (Oxygen Delivery Method): 2LPM nasal cannula  O2 Flow (L/min): 2          CONSTITUTIONAL:  NAD    ENT:   Airway patent,   Mouth with normal mucosa.   No thrush    CARDIAC:   Normal rate,   Regular rhythm.    No edema    RESPIRATORY:   No wheezing  Bilateral BS   Not tachypneic,  No use of accessory muscles    GASTROINTESTINAL:  Abdomen soft,   Non-tender,   No guarding,   + BS    NEUROLOGICAL:   Lethargic but easily awakened     SKIN:   Skin normal color for race,   No evidence of rash.            07-17-24 @ 07:01  -  07-17-24 @ 11:05  --------------------------------------------------------  IN:    Lactated Ringers Bolus: 1000 mL  Total IN: 1000 mL    OUT:    Intermittent Catheterization - Urethral (mL): 150 mL  Total OUT: 150 mL    Total NET: 850 mL          LABS:                          16.1   10.84 )-----------( 236      ( 17 Jul 2024 08:20 )             46.7                                               07-17    140  |  102  |  17  ----------------------------<  114<H>  4.2   |  25  |  1.0    Ca    9.6      17 Jul 2024 08:20    TPro  6.8  /  Alb  4.4  /  TBili  0.2  /  DBili  x   /  AST  22  /  ALT  22  /  AlkPhos  78  07-17      PT/INR - ( 17 Jul 2024 08:20 )   PT: 11.00 sec;   INR: 0.97 ratio         PTT - ( 17 Jul 2024 08:20 )  PTT:29.7 sec                                       Urinalysis Basic - ( 17 Jul 2024 08:20 )    Color: x / Appearance: x / SG: x / pH: x  Gluc: 114 mg/dL / Ketone: x  / Bili: x / Urobili: x   Blood: x / Protein: x / Nitrite: x   Leuk Esterase: x / RBC: x / WBC x   Sq Epi: x / Non Sq Epi: x / Bacteria: x                                                  LIVER FUNCTIONS - ( 17 Jul 2024 08:20 )  Alb: 4.4 g/dL / Pro: 6.8 g/dL / ALK PHOS: 78 U/L / ALT: 22 U/L / AST: 22 U/L / GGT: x                                                                                                                                       X-Rays reviewed                                                                                     ECHO    CXR interpreted by me     MEDICATIONS  (STANDING):    MEDICATIONS  (PRN):

## 2024-07-17 NOTE — ED PROVIDER NOTE - CLINICAL SUMMARY MEDICAL DECISION MAKING FREE TEXT BOX
53-year-old male past medical history as documented brought to the ED from home by his wife with confusion and altered mental status since approximately 4:30 AM today.  Stroke code activated on arrival.  Neuroexam nonfocal.  Wife attributes symptoms to amitriptyline.  All labs reviewed.  CT scans with no acute pathology.  Toxicology consulted.  EKG with no ischemia.  Critical care consulted.  Patient improved clinically while in the ED and became more alert.  Patient mated to the stepdown unit.

## 2024-07-17 NOTE — ED PROVIDER NOTE - PHYSICAL EXAMINATION
Vital Signs: I have reviewed the initial vital signs.  CONSTITUTIONAL: Pt somnolent, protecting airway  SKIN: Skin exam is warm and dry, no acute rash.  HEAD: Normocephalic; atraumatic.  EYES: PERRL 3mm, conjunctiva and sclera clear.  ENT: No nasal discharge; airway clear.  NECK: Supple  CARD: S1, S2 normal; no murmurs, gallops, or rubs. Regular rate and rhythm.  RESP: CTAB. No wheezes, rales or rhonchi.  ABD: soft; non-distended; non-tender; no hepatosplenomegaly.  MSK: No rigidity x4 extremities. No edema.   NEURO: unable to assess secondary to mental status.

## 2024-07-17 NOTE — ED PROVIDER NOTE - CARE PLAN
1 Principal Discharge DX:	AMS (altered mental status)   Principal Discharge DX:	Encephalopathy acute

## 2024-07-17 NOTE — H&P ADULT - NSICDXFAMILYHX_GEN_ALL_CORE_FT
FAMILY HISTORY:  Father  Still living? Unknown  Family history of cancer of gallbladder, Age at diagnosis: Age Unknown    Mother  Still living? Unknown  FH: breast cancer, Age at diagnosis: Age Unknown

## 2024-07-17 NOTE — PATIENT PROFILE ADULT - ABILITY TO HEAR (WITH HEARING AID OR HEARING APPLIANCE IF NORMALLY USED):
deaf in right in ear use Cros microphone hearing aid/Mildly to Moderately Impaired: difficulty hearing in some environments or speaker may need to increase volume or speak distinctly

## 2024-07-17 NOTE — CONSULT NOTE ADULT - ASSESSMENT
53-year-old male past medical history TBI, headaches, amitriptyline, presents altered mental status and stroke code this morning.  Per wife patient last known well 10 PM yesterday. NIH 25 because patient is not arousable. CTH negative. CTA negative for LVO. Outside the window for tnk. No LVO so no NI. Discussed with Dr Peter      ddx: drug overdose vs neurological etiology ( low suspicion)       suggestions:   no further neurological intervention  reconsult when patient is awake if there is a focal neurological deficit or suspicion for stroke   medical management per primary team   discussed with Dr Peter 53-year-old male past medical history TBI, headaches, amitriptyline, presents altered mental status and stroke code this morning.  Per wife patient last known well 10 PM yesterday. NIH 25 because patient is not arousable. CTH negative. CTA negative for LVO. Outside the window for tnk. No LVO so no NI. Discussed with Dr Peter      ddx: drug overdose (ie TCA); unlikely stroke       suggestions:   no further neurological intervention at this time  reconsult when patient is awake if there is a focal neurological deficit or suspicion for stroke   medical management per primary team   discussed with Dr Peter

## 2024-07-17 NOTE — H&P ADULT - NSHPLABSRESULTS_GEN_ALL_CORE
Complete Blood Count + Automated Diff (07.17.24 @ 08:20)   WBC Count: 10.84 K/uL  RBC Count: 4.85 M/uL  Hemoglobin: 16.1 g/dL  Hematocrit: 46.7 %  Mean Cell Volume: 96.3 fL  Mean Cell Hemoglobin: 33.2 pg  Mean Cell Hemoglobin Conc: 34.5 g/dL  Red Cell Distrib Width: 12.8 %  Platelet Count - Automated: 236 K/uL  MPV: 10.0 fL  Auto Neutrophil #: 6.96 K/uL  Auto Lymphocyte #: 2.60 K/uL  Auto Monocyte #: 0.75 K/uL  Auto Eosinophil #: 0.41 K/uL  Auto Basophil #: 0.06 K/uL  Auto Neutrophil %: 64.1:  Auto Lymphocyte %: 24.0 %  Auto Monocyte %: 6.9 %  Auto Eosinophil %: 3.8 %  Auto Basophil %: 0.6 %  Nucleated RBC: 0 /100 WBCs    Comprehensive Metabolic Panel (07.17.24 @ 08:20)   Sodium: 140 mmol/L  Potassium: 4.2: Slighty Hemolyzed use with Caution mmol/L  Chloride: 102 mmol/L  Carbon Dioxide: 25 mmol/L  Anion Gap: 13 mmol/L  Blood Urea Nitrogen: 17 mg/dL  Creatinine: 1.0 mg/dL  Glucose: 114 mg/dL  Calcium: 9.6 mg/dL  Protein Total: 6.8 g/dL  Albumin: 4.4 g/dL  Bilirubin Total: 0.2 mg/dL  Alkaline Phosphatase: 78 U/L  Aspartate Aminotransferase (AST/SGOT): 22: Hemolyzed. Interpret with caution U/L  Alanine Aminotransferase (ALT/SGPT): 22 U/L  eGFR: 89:   Auto Immature Granulocyte %: 0.6    Propoxyphene Qualitative Urine Result: Negative  Fentanyl, Ur: Negative:  Oxycodone, Urine: Negative:  Phencyclidine Level, Urine: Negative  THC, Urine Qualitative: Negative  Methadone, Urine: Negative:   Barbiturates Screen, Urine: Negative  Opiate, Urine: Negative  Cocaine Metabolite, Urine: Negative  Benzodiazepine, Urine: Negative  Amphetamine, Urine: Negative    CXR (7/17): Patchy bilateral opacifications. Low lung volumes leads to magnification of the pulmonary interstitium.    CT Angio Neck, Head/Neck (7/17): No perfusion defect using standard threshold. No large vessel occlusion, stenosis, aneurysm, or vascular malformation. Prominent bilateral palatine tonsils. Mildly enlarged right level 2A and bilateral level 5 lymph nodes, nonspecific in etiology. Recommend nonemergent follow-up with ENT.    CT Brain (7/17): No acute intracranial pathology. Further evaluation with MRI can be considered if the symptoms persist (if no contraindication).

## 2024-07-17 NOTE — ED PROVIDER NOTE - ATTENDING APP SHARED VISIT CONTRIBUTION OF CARE
53-year-old male past medical history of TBI status post fall on his head with LOC 12 years ago, mild cognitive impairment, mild memory impairment, chronic headaches presenting for evaluation of confusion since this morning. According to his wife the patient went to sleep in his usual state of health, he woke up about 5:30 AM, seemed confused trying to claw at walls and trying to get out of bed. He was still able to speak with her, his speech was not slurred. Wife reports that in the past the patient accidentally took too many tablets of amitriptyline ( 1 or 2 too many). No history of suicide attempt or psychiatric disease, No recent illness, no drug or alcohol intake. He occasionally takes Tylenol for headaches, never excessive amounts. Middle-age male on stretcher somnolent, wakes up to sternal rubs, PERRL, head atraumatic/normocephalic, neck is supple, no midline spine tenderness to palpation, no step-offs, lungs clear to auscultation, equal air entry, protecting airway, no  chest wall or abdominal TTP, bowel sounds present in all quadrants, skin is warm to the touch, no diaphoresis or excessive dryness, no skin rash, distal pulses are intact, RRR, no muscle rigidity, unable to assess mental status due to somnolence. Stroke code was called from triage. Plan: No imaging, EKG, chest x-ray, labs, will reach out to toxicology service, plan to admit.

## 2024-07-17 NOTE — H&P ADULT - NSICDXPASTMEDICALHX_GEN_ALL_CORE_FT
PAST MEDICAL HISTORY:  Chronic headaches     Current smoker     Insomnia     Major depression     TBI (traumatic brain injury)     Vertigo
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
## 2024-07-17 NOTE — H&P ADULT - ASSESSMENT
53 year old man with past medical history of TBI, headaches on amitriptyline, depression, insomnia presented to the ED for altered mental status, stroke workup, and admitted for unintentional drug overdose likely 2/2 to amitriptyline and further management.    #Unintentional drug overdose vs Neurologic etiology (low suspicion)  #AMS  - CXR (7/17): Patchy bilateral opacifications. Low lung volumes leads to magnification of the pulmonary interstitium.  - CT Angio Neck, Head/Neck (7/17): No perfusion defect using standard threshold. No large vessel occlusion, stenosis, aneurysm, or vascular malformation. Prominent bilateral palatine tonsils. Mildly enlarged right level 2A and bilateral level 5 lymph nodes, nonspecific in etiology. Recommend nonemergent follow-up with ENT.  - CT Brain (7/17): No acute intracranial pathology. Further evaluation with MRI can be considered if the symptoms persist (if no contraindication).  - Stroke w/up negative so far  - as per Neuro no further intervention  - as per Toxicology team supportive care, repeat EKG in 3 hrs,   - continue to monitor patient for change in mental status, hypotension, arrythmia  - Urine tox negative, low salicylate/acetaminophen/ethanol levels  - f/u serum toxicology, EEG, serial EKGs, TTE per crit care recs  - started bicarb 100 IV  - repeat EKG in 2 hours  - bladder scan q6  - ISS          DVT Prophylaxis: Lovenox  Activity:  Bedrest  Diet: NPO   53 year old man with past medical history of TBI, headaches on amitriptyline, depression, insomnia presented to the ED for altered mental status, stroke workup, and admitted for unintentional drug overdose likely 2/2 to amitriptyline and further management.    #Unintentional drug overdose vs Neurologic etiology (low suspicion)  #AMS( improving as per wife)  -    - ED Vitals: /73  RR 18 T 97 F on NC at 2L   - CXR (7/17): Patchy bilateral opacifications. Low lung volumes leads to magnification of the pulmonary interstitium.  - CT Angio Neck, Head/Neck (7/17): No perfusion defect using standard threshold. No large vessel occlusion, stenosis, aneurysm, or vascular malformation. Prominent bilateral palatine tonsils. Mildly enlarged right level 2A and bilateral level 5 lymph nodes, nonspecific in etiology. Recommend nonemergent follow-up with ENT.  - CT Brain (7/17): No acute intracranial pathology. Further evaluation with MRI can be considered if the symptoms persist (if no contraindication).  - Stroke w/up negative so far  - as per Neuro no further intervention  - as per Toxicology team supportive care, repeat EKG in 3 hrs,   - continue to monitor patient for change in mental status, hypotension, arrythmia  - Urine tox negative, low salicylate/acetaminophen/ethanol levels  - f/u serum toxicology,  serial EKGs, TTE   - stat  bicarb 100 IV   - repeat EKG in 2 hours, qrs 94,   - bladder scan q6( straight cath done once)   - ISS (Monitor BS)          DVT Prophylaxis: Lovenox  Activity:  Bedrest  Diet: NPO  dispo: step down   pending: moniter Qtc, EKG every 6hr, echo , NPO for now until mental status is back to baseline, bladder scan every 6hr   53 year old man with past medical history of TBI, headaches on amitriptyline, depression, insomnia presented to the ED for altered mental status, stroke workup, and admitted for unintentional drug overdose likely 2/2 to amitriptyline and further management.    #Unintentional drug overdose vs Neurologic etiology (low suspicion)  #AMS( improving as per wife)  #Prior h/o of unintentional doubling dosage of amitriptyline  - Initial presentation as per wife: somnolence, abnormal gait, imbalance, no response to voice or command, lethargy  - ED Vitals: /73  RR 18 T 97 F on NC at 2L   - CXR (7/17): Patchy bilateral opacifications. Low lung volumes leads to magnification of the pulmonary interstitium.  - CT Angio Neck, Head/Neck (7/17): No perfusion defect using standard threshold. No large vessel occlusion, stenosis, aneurysm, or vascular malformation. Prominent bilateral palatine tonsils. Mildly enlarged right level 2A and bilateral level 5 lymph nodes, nonspecific in etiology. Recommend nonemergent follow-up with ENT.  - CT Brain (7/17): No acute intracranial pathology. Further evaluation with MRI can be considered if the symptoms persist (if no contraindication).  - Stroke w/up negative so far  - as per Neuro no further intervention  - as per Toxicology team supportive care, repeat EKG in 3 hrs,   - continue to monitor patient for change in mental status, hypotension, arrythmia  - Urine tox negative, low salicylate/acetaminophen/ethanol levels  - f/u serum toxicology,  serial EKGs, TTE   - stat bicarb 100 IV   - repeat EKG in 2 hours, Last EKG qrs: 94  - bladder scan q6 (straight cath done once)   - seizure precautions  - TCA toxicity can cause confusion, coma, cardiotoxicity - monitor qTC  - f/u with patient when returned to baseline if consumed medication intentionally - to assess for underlying SI or psych condition  - ISS (Monitor BS)    DVT Prophylaxis: Lovenox  Activity:  Bedrest  Diet: NPO  dispo: step down   pending: moniter Qtc, EKG every 6hr, echo , NPO for now until mental status is back to baseline, bladder scan every 6hr   53 year old man with past medical history of TBI, headaches on amitriptyline, depression, insomnia presented to the ED for altered mental status, stroke workup, and admitted for unintentional drug overdose likely 2/2 to amitriptyline and further management.    #Unintentional drug overdose vs Neurologic etiology (low suspicion)  #AMS( improving as per wife)  #Prior h/o of unintentional doubling dosage of amitriptyline  - Initial presentation as per wife: somnolence, abnormal gait, imbalance, no response to voice or command, lethargy  - ED Vitals: /73  RR 18 T 97 F on NC at 2L   - CXR (7/17): Patchy bilateral opacifications. Low lung volumes leads to magnification of the pulmonary interstitium.  - CT Angio Neck, Head/Neck (7/17): No perfusion defect using standard threshold. No large vessel occlusion, stenosis, aneurysm, or vascular malformation. Prominent bilateral palatine tonsils. Mildly enlarged right level 2A and bilateral level 5 lymph nodes, nonspecific in etiology. Recommend nonemergent follow-up with ENT.  - CT Brain (7/17): No acute intracranial pathology. Further evaluation with MRI can be considered if the symptoms persist (if no contraindication).  - Stroke w/up negative so far  - as per Neuro no further intervention  - as per Toxicology team supportive care, repeat EKG in 3 hrs,   - continue to monitor patient for change in mental status, hypotension, arrythmia  - Urine tox negative, low salicylate/acetaminophen/ethanol levels  - f/u serum toxicology,  serial EKGs, TTE   - stat bicarb 100 IV   - repeat EKG in 2 hours, Last EKG qrs: 94  - bladder scan q6 (straight cath done once)   - seizure precautions  - TCA toxicity can cause confusion, coma, cardiotoxicity - monitor qTC  - f/u with patient when returned to baseline if consumed medication intentionally - to assess for underlying SI or psych condition  - ISS (Monitor BS)    DVT Prophylaxis: Lovenox  Activity:  Bedrest  Diet: NPO  dispo: step down   Admission supervised by senior/discussed with attending about the plan   pending: moniter Qtc, EKG every 6hr, echo , NPO for now until mental status is back to baseline, bladder scan every 6hr

## 2024-07-17 NOTE — CONSULT NOTE ADULT - ASSESSMENT
Clinical Context:   Tricyclic antidepressants can manifest significant cardiovascular and CNS toxicity in large overdoses. Cardiovascular toxicity include QRS prolongation which can lead to wide-complex tachyarrhythmias and QTc prolongation which can lead to Torsade de pointes. Hypotension in part due to peripheral vasodilation may occur. CNS toxicity include seizures likely from norepinephrine and serotonin reuptake inhibition delirium/psychosis from antimuscarinic and antihistaminergic effects. Peripheral antimuscarinic toxidrome such as dry mucus membrane, flushed skin, delirium, urinary retention, ileus, and mydriasis may also occur. Serotonin syndrome can occur, usually in the context of another serotonergic agent.      53-year-old male presenting for altered mental status consulting toxicology due to possible amitriptyline exposure.  Patient's EKG is slightly tachycardic 111 with slight elongated QRS of 108 and hypertensive.  Do not have prior EKG on file for comparison.  While patient is exhibiting altered mental status and tachycardia he does not have hyperthermia or dilated pupils therefore cannot conclude or exclude antimuscarinic toxicity.  Will advise to further observe for signs antimuscarinic toxicity and hold any treatment at this time.  Additionally the patient's EKG does have slightly prolongated QRS without any significant changes in aVR or hypotension.  Will hold bicarb at this time due to concern of affecting his electrolytes and acid-base status.  Will advised to continue observing and repeat EKG 3 hours after initial EKG.  If there are no significant changes to QRS then no indication for treatment however if patient has worsening prolongation please call toxicology and consideration of giving 3 Amp of sodium bicarb.      #Recommendations  - Supportive care, repeat ekg in 3 hours observe for signs of hypotension, arrhythmias, or changes in mental status   - Obtain routine toxicological labs including CBC, CMP, serum acetaminophen, salicylate, ethanol, and EKG  - If asymptomatic with normal vitals signs and labs at end of observation period, cleared from acute toxicological standpoint    - Further medical and/or psychiatric care per primary team    Thank you for involving us in the care of this patient. Assessment and plan discussed with toxicology attending Dr. Juan Mcduffie. Please do not hesitate to reach out to the toxicology team for any further questions or concerns.    The On-Call Toxicology Fellow can be reached 24/7 via Pager #[phone]  Please send a 10 digit call back # as [facility] cover multiple hospitals    Olivia Liz MD  Toxicology Fellow  PGY-4

## 2024-07-17 NOTE — ED ADULT NURSE NOTE - CHPI ED NUR SYMPTOMS POS
lethargic, unable to speak/CHANGE IN LEVEL OF CONSCIOUSNESS somnolent, unable to speak/CHANGE IN LEVEL OF CONSCIOUSNESS

## 2024-07-17 NOTE — STROKE CODE NOTE - IV ALTEPLASE EXCL REL HIDDEN
"    [facility] Medicine Services  History & Physical    Patient Identification:  Name:  Feliciano Umana  Age:  20 y.o.  Sex:  male  :  1999  MRN:  [id number]   Visit Number:  [id number]  Primary Care Physician:  Jana Pickett APRN    I have seen the patient in conjunction with JOEL Quispe and I agree with the following statements:    Subjective     Chief complaint: Nausea with vomiting    History of presenting illness:      Feliciano Umana is a 20 y.o. male with past medical history significant for lactose intolerance but otherwise healthy.    Mr. Umana presented to [facility] emergency department on 2020 with complaints of acute onset nausea with associated vomiting which began on the afternoon of 2020, just an hour or so before he arrived to the ED. His emesis was initially just chunks of the food he had eaten earlier in the day, but then turned to water. He also reports generalized abdominal pain as well as chills and diaphoresis.  He also reports 2 episodes of diarrhea.  He describes the diarrhea as \" water like\" as well.  Mr. Umana reports a history of lactose intolerance and reports having intermittent mild abdominal cramping at times.  He reports that when the abdominal cramping began this afternoon he attributed it to the lactose intolerance. However, he recalls that he had eaten ontiveros, eggs and toast for breakfast without cheese and had not consumed any other food since. Furthermore, he states his lactose intolerance is never as sever as what he experienced today. He denies any recent infections, recent antibiotic use, fever, chills, appetite changes, weakness, shortness of breath, chest pain, urinary symptoms, dizziness, falls or syncopal episodes.  He also denies any recent travel.  He does report however increased flatulence throughout the day and cold sweats.  He denies any tobacco, alcohol or drug use.    Upon arrival to the ED, vital " signs were temperature 97.9, pulse 97, respirations 18, blood pressure 120/86 and oxygen saturation 96% on room air.  Blood glucose 157, sodium 141, potassium 4.9, anion gap 13.6, creatinine 0.98, BUN 19, EGFR 97, ALT 66, AST 31, lactate 3.6, WBC 22.04, hemoglobin 18.5 and hematocrit 55.2.  Influenza A and B negative.  Urinalysis shows 2+ ketones, negative nitrite, negative leukocytes, trace protein and moderate bilirubin.  Acetone negative.  Blood cultures pending.  CT the abdomen shows probable pseudo-thickening of the transverse and right colon rather than low grade inflammatory/infectious colitis but this should be correlated clinically.  Appendix top normal in diameter.  Hepatic steatosis, per radiology.  ---------------------------------------------------------------------------------------------------------------------   Review of Systems   Constitutional: Positive for chills, diaphoresis and fatigue. Negative for activity change, appetite change and fever.   HENT: Negative for congestion, postnasal drip, rhinorrhea, sinus pressure, sinus pain, sore throat and trouble swallowing.    Eyes: Negative for photophobia, pain, discharge, redness, itching and visual disturbance.   Respiratory: Negative for cough, chest tightness, shortness of breath and wheezing.    Cardiovascular: Negative for chest pain, palpitations and leg swelling.   Gastrointestinal: Positive for abdominal pain, diarrhea, nausea and vomiting. Negative for abdominal distention and constipation.   Endocrine: Negative for cold intolerance, heat intolerance, polydipsia, polyphagia and polyuria.   Genitourinary: Negative for difficulty urinating, dysuria, flank pain, frequency, hematuria and urgency.   Musculoskeletal: Negative for arthralgias, back pain, gait problem, joint swelling, myalgias, neck pain and neck stiffness.   Skin: Negative for color change, pallor, rash and wound.   Neurological: Negative for dizziness, tremors, syncope, weakness,  light-headedness and headaches.   Hematological: Negative for adenopathy. Does not bruise/bleed easily.   Psychiatric/Behavioral: Negative for agitation, confusion, hallucinations and sleep disturbance.      ---------------------------------------------------------------------------------------------------------------------   Medical History:  - lactose intolerance    History reviewed. No pertinent surgical history.    Family History:  - Type II diabetes (father, brother)    Social History     Socioeconomic History   • Marital status: Single     Spouse name: Not on file   • Number of children: Not on file   • Years of education: Not on file   • Highest education level: Not on file   Tobacco Use   • Smoking status: Never Smoker   • Smokeless tobacco: Never Used   Substance and Sexual Activity   • Alcohol use: Never     Frequency: Never   • Drug use: Never   • Sexual activity: Defer     ---------------------------------------------------------------------------------------------------------------------   Allergies:  Patient has no known allergies.  ---------------------------------------------------------------------------------------------------------------------   Home medications:    Medications below are reported home medications pulling from within the system; at this time, these medications have not been reconciled unless otherwise specified and are in the verification process for further verifcation as current home medications.  No medications prior to admission.       Hospital Scheduled Meds:    sodium chloride 10 mL Intravenous Q12H       sodium chloride 100 mL/hr Last Rate: 100 mL/hr (02/10/20 0413)     Current listed hospital scheduled medications may not yet reflect those currently placed in orders that are signed and held awaiting patient's arrival to floor.   ---------------------------------------------------------------------------------------------------------------------     Objective     Vital  Signs:  Temp:  [97 °F (36.1 °C)-98.3 °F (36.8 °C)] 98.3 °F (36.8 °C)  Heart Rate:  [] 106  Resp:  [17-18] 18  BP: (107-130)/(69-88) 130/77      02/09/20  1958 02/10/20  0349   Weight: 89.8 kg (198 lb) 88 kg (194 lb)     Body mass index is 27.06 kg/m².  ---------------------------------------------------------------------------------------------------------------------   Physical Exam   Constitutional: He is oriented to person, place, and time and well-developed, well-nourished, and in no distress. No distress.   HENT:   Head: Normocephalic and atraumatic.   Mouth/Throat: Oropharynx is clear and moist.   Eyes: Pupils are equal, round, and reactive to light. Conjunctivae and EOM are normal.   Neck: Normal range of motion. Neck supple. No JVD present. No thyromegaly present.   Cardiovascular: Regular rhythm, normal heart sounds and intact distal pulses.   No murmur heard.  Mildly tachycardic   Pulmonary/Chest: Effort normal and breath sounds normal. No respiratory distress. He has no wheezes. He has no rales. He exhibits no tenderness.   Abdominal: Soft. He exhibits no distension. Bowel sounds are hyperactive. There is no tenderness.   Musculoskeletal: Normal range of motion. He exhibits no edema, tenderness or deformity.   Lymphadenopathy:     He has no cervical adenopathy.   Neurological: He is alert and oriented to person, place, and time.   No gross focal deficits appreciated   Skin: Skin is warm and dry. No rash noted. He is not diaphoretic. No erythema. No pallor.   Psychiatric: Mood, memory, affect and judgment normal.     ---------------------------------------------------------------------------------------------------------------------  EKG:    Ordered, results pending  ---------------------------------------------------------------------------------------------------------------------   Results from last 7 days   Lab Units 02/10/20  0023 02/09/20 2055   LACTATE mmol/L 3.6*  --    WBC 10*3/mm3  --   22.04*   HEMOGLOBIN g/dL  --  18.5*   HEMATOCRIT %  --  55.2*   MCV fL  --  88.2   MCHC g/dL  --  33.5   PLATELETS 10*3/mm3  --  288         Results from last 7 days   Lab Units 02/09/20 2055   SODIUM mmol/L 141   POTASSIUM mmol/L 4.9   CHLORIDE mmol/L 102   CO2 mmol/L 25.4   BUN mg/dL 19   CREATININE mg/dL 0.98   EGFR IF NONAFRICN AM mL/min/1.73 97   CALCIUM mg/dL 9.8   GLUCOSE mg/dL 157*   ALBUMIN g/dL 5.08   BILIRUBIN mg/dL 0.8   ALK PHOS U/L 86   AST (SGOT) U/L 31   ALT (SGPT) U/L 66*   Estimated Creatinine Clearance: 148.4 mL/min (by C-G formula based on SCr of 0.98 mg/dL).  No results found for: AMMONIA          No results found for: HGBA1C  Lab Results   Component Value Date    TSH 0.375 (L) 12/02/2018     No results found for: PREGTESTUR, PREGSERUM, HCG, HCGQUANT  Pain Management Panel     Pain Management Panel Latest Ref Rng & Units 2/9/2020 12/2/2018    AMPHETAMINES SCREEN, URINE Negative Negative Negative    BARBITURATES SCREEN Negative Negative Negative    BENZODIAZEPINE SCREEN, URINE Negative Negative Negative    BUPRENORPHINEUR Negative Negative Negative    COCAINE SCREEN, URINE Negative Negative Negative    METHADONE SCREEN, URINE Negative Negative Negative        I have reviewed the above lab results.  ---------------------------------------------------------------------------------------------------------------------  Imaging Results (Last 7 Days)     Procedure Component Value Units Date/Time    CT Abdomen Pelvis With Contrast [[id number]] Collected:  02/09/20 2332     Updated:  02/09/20 2334    Narrative:       CT Abdomen Pelvis W    INDICATION:   20-year-old male with vomiting fever and abdominal pain today    TECHNIQUE:   CT of the abdomen and pelvis with IV contrast. Coronal and sagittal reconstructions were obtained.  Radiation dose reduction techniques included automated exposure control or exposure modulation based on body size. Count of known CT and cardiac nuc med  studies performed in  previous 12 months: 0.     COMPARISON:   None available.    FINDINGS:  Abdomen: The included lung bases are clear. Normal caliber aorta. Tiny umbilical hernia with fat only. Spleen and adrenal glands and pancreas are unremarkable. Negative gallbladder. Probable mild hepatic steatosis. Negative kidneys.    Pelvis: Negative bladder and prostate gland. No drainable fluid collection. The appendix is top normal in diameter at 6 mm without additional CT evidence of acute appendicitis. There is pseudothickening versus low-grade inflammatory change of the  transverse colon and right colon. Small amount of fluid within the right colon. Negative inguinal canals. No suspicious bone lesion.      Impression:         1. Probable pseudothickening of the transverse and right colon rather than low-grade inflammatory/infectious colitis but this should be correlated clinically.  2. Appendix top normal in diameter.  3. Hepatic steatosis.          Signer Name: Robel Castellano MD   Signed: 2/9/2020 11:32 PM   Workstation Name: [email or web address]    [facility]        I have personally reviewed the radiology images and read the final radiology report.  ---------------------------------------------------------------------------------------------------------------------  Assessment / Plan     Active Hospital Problems    Diagnosis POA   • Colitis [K52.9] Yes       ASSESSMENT/PLAN:  · SIRS criteria present on admission with tachycardia, leukocytosis and lactic acidosis. Suspect secondary to vial gastroenteritis. CT of the abdomen pelvis with contrast shows pseudo-thickening of the transverse and right colon rather than a low-grade inflammatory/infectious colitis, but should be correlated clinically, per radiology. Patient has no abdominal tenderness on exam so colitis felt to be unlikely. Urinalysis unremarkable.  UDS negative.  Influenza A and B negative.  Blood cultures pending. Continue to trend WBC and lactate. Send stool  for GI PCR if patient has any recurrent diarrhea. Treat supportively at this time with IV fluids and antiemetics. Will refrain from starting antibiotics for now but this may change depending on clinical course.     · Hyperglycemia, mild: Blood glucose 157.  Patient denies a history of diabetes mellitus but reports strong family history.  Hemoglobin A1c ordered, results pending. Urinalysis shows trace proteins and 2+ ketones, but serum acetone negative and VBG unremarkable so does not meet criteria for DKA.       ----------  -DVT prophylaxis: SCDs  -Diet: NPO  -Activity: Up Ad Lucy  -Expected length of stay: INPATIENT status due to the need for care which can only be reasonably provided in an hospital setting such as aggressive/expedited ancillary services and/or consultation services, the necessity for IV medications, close physician monitoring and/or the possible need for procedures.  In such, I feel patient’s risk for adverse outcomes and need for care warrant INPATIENT evaluation and predict the patient’s care encounter to likely last beyond 2 midnights.    Plan of care discussed with patient and his RN Dayan who was present [facility]     Tigre Yarbrough MD  02/10/20  5:15 AM   ----------------------------------------------------------------------------------  * I have seen the patient independently from JOEL Quispe, and have amended her note to reflect my own findings, assessment and plan.      show

## 2024-07-17 NOTE — CONSULT NOTE ADULT - ATTENDING COMMENTS
Mr. Mina was seen and examined at his stretcher in the emergency department today with his wife present.  This patient was brought to the emergency department this morning due to altered mental status, found to be unresponsive by his wife.  So far workup has been unremarkable with minimal leukocytosis.  Based on history, it is possible patient overdosed on TCA accidentally.  UDS and urinalysis are pending.  Follow tox recommendations, serial EKGs and monitoring.  Per wife, patient is increasingly awake over the past few hours without further treatment.  Admit to SDU for close monitoring over the next 24 hours and further workup of altered mental status.  I agree with the fellow note, with the exceptions listed in my attestation above.  The remainder of impression and plan per fellow note.

## 2024-07-17 NOTE — ED ADULT NURSE NOTE - NSICDXPASTMEDICALHX_GEN_ALL_CORE_FT
PAST MEDICAL HISTORY:  TBI (traumatic brain injury)      PAST MEDICAL HISTORY:  Chronic headaches     Current smoker     TBI (traumatic brain injury)

## 2024-07-17 NOTE — PATIENT PROFILE ADULT - FALL HARM RISK - HARM RISK INTERVENTIONS

## 2024-07-17 NOTE — PATIENT PROFILE ADULT - STATED REASON FOR ADMISSION
as per wife Jeannie "He was out of it due to the medication.  He woke up acting like he was in a dream, he tried out get oob and fell.  It was hard to get him up even the EMT couldn't get a response from him this morning"  AMS

## 2024-07-17 NOTE — H&P ADULT - HISTORY OF PRESENT ILLNESS
Patient is a 53 year old man with past medical history of TBI, headaches on amitriptyline, depression, insomnia presented to the ED brought in by his wife for altered mental status and stroke code this morning. The patient's spouse states that the patient regularly sleeps at 4AM, and when the wife woke up around 5:00 AM she noticed that the patient was "tossing and turning" in bed more than usually, and found that he was laying in a "abnormal position" as well. The patient's spouse then went on to say that the patient tried getting up from the bed, however his body was leaning side to side and then fell down from sitting position height with no head trauma. Patient's spouse then states that they patient went on to sleeping on the ground, and was unable to be woken up by the spouse who then called 911.    Patient spouse endorses somnolence and increased sleepiness. States that the patient also has migraines with phonophobia on the right ear as well as constipation but states they are chronic conditions. States that an event like this has never happen previously. States that the patient is able to take medications on his own, and in the past has accidentaly taken a double dose of amitrptyline before unintentionally. States that the patient sometimes forgets that he already took his medication which is why he accidentally takes another dosage. Patient's wife states that during those episodes, the patient becomes very sleepy, tired, and "out of state" and states that resolves within a few hours. Patient denies recent travel, sick contacts, recent illness. Patient denies fever, chills, N/V, cough, chest pain, palpitations diarrhea, and muscle weakness. Patient's spouse denies any recent changes to behavior prior to admission; states the patient was acting like his normal self last night.    ED Course  Vitals: /73  RR 18 T 97 F on NC at 2L    Imaging:  - CXR (7/17): Patchy bilateral opacifications. Low lung volumes leads to magnification of the pulmonary interstitium.  - CT Angio Neck, Head/Neck (7/17): No perfusion defect using standard threshold. No large vessel occlusion, stenosis, aneurysm, or vascular malformation. Prominent bilateral palatine tonsils. Mildly enlarged right level 2A and bilateral level 5 lymph nodes, nonspecific in etiology. Recommend nonemergent follow-up with ENT.  CT Brain (7/17): No acute intracranial pathology. Further evaluation with MRI can be considered if the symptoms persist (if no contraindication).  - EKG    Labs:  WBC: 10.84  Blood glucose on arrival: 124  K: 4.2 (slightly hemolyzed)  Urine toxicology - negative  Blood alcohol <10  Acetaminophen level <5 and salicylate level <0.3  RVP negative    ED Course: s/p 1L LR Bolus,

## 2024-07-17 NOTE — H&P ADULT - ATTENDING COMMENTS
***My note supersedes any discrepancies that may be above in the resident's note***    53 year old man with past medical history of TBI, headaches on amitriptyline, depression, insomnia presented to the ED for altered mental status, stroke workup, and admitted for unintentional drug overdose likely 2/2 to amitriptyline and further management. Plan below was discussed and modified by myself after discussing with resident and student.     #Unintentional drug overdose vs Neurologic etiology (low suspicion)  #AMS( improving as per wife)  #Prior h/o of unintentional doubling dosage of amitriptyline  - Initial presentation as per wife: somnolence, abnormal gait, imbalance, no response to voice or command, lethargy  - ED Vitals: /73  RR 18 T 97 F on NC at 2L   - CXR (7/17): Patchy bilateral opacifications. Low lung volumes leads to magnification of the pulmonary interstitium.  - CT Angio Neck, Head/Neck (7/17): No perfusion defect using standard threshold. No large vessel occlusion, stenosis, aneurysm, or vascular malformation. Prominent bilateral palatine tonsils. Mildly enlarged right level 2A and bilateral level 5 lymph nodes, nonspecific in etiology. Recommend nonemergent follow-up with ENT.  - CT Brain (7/17): No acute intracranial pathology. Further evaluation with MRI can be considered if the symptoms persist (if no contraindication).  - Stroke w/up negative so far  - as per Neuro no further intervention  - as per Toxicology team supportive care, repeat EKG in 3 hrs,   - continue to monitor patient for change in mental status, hypotension, arrythmia  - Urine tox negative, low salicylate/acetaminophen/ethanol levels  - f/u serum toxicology,  serial EKGs, TTE   - stat bicarb 100 IV   - repeat EKG in 2 hours, Last EKG qrs: 94  - bladder scan q6 (straight cath done once)   - seizure precautions  - TCA toxicity can cause confusion, coma, cardiotoxicity - monitor qTC  - f/u with patient when returned to baseline if consumed medication intentionally - to assess for underlying SI or psych condition  - ISS (Monitor BS)    DVT Prophylaxis: Lovenox  Activity:  Bedrest  Diet: NPO  dispo: step down   pending: moniter Qtc, EKG every 6hr, echo , NPO for now until mental status is back to baseline, bladder scan every 6hr

## 2024-07-17 NOTE — ED PROVIDER NOTE - OBJECTIVE STATEMENT
53-year-old male past medical history TBI, headaches, amitriptyline, presents altered mental status and stroke code this morning.  Per wife patient last known well 10 PM yesterday.  Wife states around 430–5 AM today patient was acting erratically, scratching at walls, trying to get out of bed, slurring speech, only able to say his name, so she called 911.  Wife states patient has been more somnolent and sleepy en route and in the ED.  Patient noncontributory to history secondary to mental status.  Wife states patient sometimes takes excess of amitriptyline, but not with SI.  States anytime he accidentally takes extra it only 1 or 2 pills.  Wife states no other known ingestions.

## 2024-07-17 NOTE — H&P ADULT - NSHPPHYSICALEXAM_GEN_ALL_CORE
GENERAL: sleeping in bed, drowsy lethargic male  NERVOUS SYSTEM:  A&Ox2, responds to voice, follows commad  HEAD:  Atraumatic, normocephalic  NECK: Supple, trachea midline, no JVD  HEART: Regular rate and rhythm  LUNGS: Unlabored respirations.  Clear to auscultation bilaterally, no crackles, wheezing, or rhonchi  ABDOMEN: Soft, nontender, nondistended, +BS  EXTREMITIES: 2+ peripheral pulses bilaterally. No clubbing, cyanosis, or edema GENERAL: sleeping in bed, drowsy lethargic male  NERVOUS SYSTEM:  A&Ox2, responds to voice, follows commands, improving as per pt wife  HEAD:  Atraumatic, normocephalic  NECK: Supple, trachea midline, no JVD  HEART: Regular rate and rhythm  LUNGS: Unlabored respirations.  Clear to auscultation bilaterally, no crackles, wheezing, or rhonchi  ABDOMEN: Soft, nontender, nondistended, +BS  EXTREMITIES: 2+ peripheral pulses bilaterally. No clubbing, cyanosis, or edema

## 2024-07-17 NOTE — CONSULT NOTE ADULT - ASSESSMENT
IMPRESSION:      PLAN:    CNS: Stroke w/up negative.  Obtain Urine and serum toxicology.  EEG.    HEENT: Oral care.  On end-tidal CO2.    PULMONARY:  HOB @ 45 degrees.  Aspiration precautions.    CARDIOVASCULAR: TTE.  Serial EKGs per tox.  If worseing QRS - stat Bicarb 150 push and call toxicology.    GI: GI prophylaxis.  NPO for now.  Bowel regimen     RENAL:  Follow up lytes.  Correct as needed    INFECTIOUS DISEASE: Monitor off Abx    HEMATOLOGICAL:  DVT prophylaxis.    ENDOCRINE:  Follow up FS.  Insulin protocol if needed.    MUSCULOSKELETAL: Bedrest    SDU         IMPRESSION:    AMS  Suspected TCA overdose  TBI  Migraines on amitriptyline  Active smoker    PLAN:    CNS: Stroke w/up negative so far.  Obtain Urine and serum toxicology.  EEG.  MRI if neuro requests.    HEENT: Oral care.  On end-tidal CO2.    PULMONARY:  HOB @ 45 degrees.  Aspiration precautions.    CARDIOVASCULAR: TTE.  Serial EKGs per tox.  If worsening QRS - stat Bicarb 150 push and call toxicology.    GI: GI prophylaxis.  NPO for now.  Bowel regimen     RENAL:  Follow up lytes.  Correct as needed    INFECTIOUS DISEASE: Monitor off Abx.    HEMATOLOGICAL:  DVT prophylaxis.    ENDOCRINE:  Follow up FS.  Insulin protocol if needed.    MUSCULOSKELETAL: Bedrest    SDU         IMPRESSION:    AMS  Suspected TCA overdose  Mediastinal LAD  TBI  Migraines on amitriptyline  Active smoker    PLAN:    CNS: Stroke w/up negative so far.  Obtain Urine and serum toxicology.  EEG.  MRI if neuro requests.    HEENT: Oral care.  On end-tidal CO2.    PULMONARY:  HOB @ 45 degrees.  Aspiration precautions.  OP CT chest for Lung cancer screening and mediastinal LAD surveillance.     CARDIOVASCULAR: TTE.  Serial EKGs per tox.  If worsening QRS - stat Bicarb 150 push and call toxicology.    GI: GI prophylaxis.  NPO for now.  Bowel regimen     RENAL:  Follow up lytes.  Correct as needed    INFECTIOUS DISEASE: Monitor off Abx.    HEMATOLOGICAL:  DVT prophylaxis.    ENDOCRINE:  Follow up FS.  Insulin protocol if needed.    MUSCULOSKELETAL: Bedrest    SDU

## 2024-07-17 NOTE — CONSULT NOTE ADULT - SUBJECTIVE AND OBJECTIVE BOX
MEDICAL TOXICOLOGY CONSULT    HPI:  53-year-old male history of TBI presenting for altered mental status and possible TCA exposure.  Last seen normal was around 10 PM last night per ER physician then was witnessed by his wife around 430 or 5 AM this morning acting erratically with slurred speech and unsteady gait.  He arrived to the emergency department as a stroke alert.  He is being evaluated by neurology and thus far has had unremarkable workup.  Of note patient does take 100 mg of amitriptyline at home and wife notes he occasionally does take an extra dose.  On exam patient is calm, sedated however does respond to painful stimuli, nonverbal, pupils are 2 to 3 mm bilaterally reactive, skin is dry, no clonus or rigidity    ECG:  rate 111, no terminal r in Avr greater than 40ms, ,     PAST MEDICAL & SURGICAL HISTORY:  TBI (traumatic brain injury)    MEDICATION HISTORY:    PHYSICAL EXAM  Vital Signs Last 24 Hrs  T(C): 36.1 (17 Jul 2024 10:00), Max: 36.4 (17 Jul 2024 08:40)  T(F): 97 (17 Jul 2024 10:00), Max: 97.5 (17 Jul 2024 08:40)  HR: 105 (17 Jul 2024 10:00) (105 - 118)  BP: 161/76 (17 Jul 2024 10:00) (127/86 - 161/76)  BP(mean): --  RR: 15 (17 Jul 2024 10:00) (14 - 18)  SpO2: 97% (17 Jul 2024 10:00) (93% - 97%)    Parameters below as of 17 Jul 2024 10:00  Patient On (Oxygen Delivery Method): nasal cannula  O2 Flow (L/min): 3      SIGNIFICANT LABORATORY STUDIES:                        16.1   10.84 )-----------( 236      ( 17 Jul 2024 08:20 )             46.7       07-17    140  |  102  |  17  ----------------------------<  114<H>  4.2   |  25  |  1.0    Ca    9.6      17 Jul 2024 08:20    TPro  6.8  /  Alb  4.4  /  TBili  0.2  /  DBili  x   /  AST  22  /  ALT  22  /  AlkPhos  78  07-17      PT/INR - ( 17 Jul 2024 08:20 )   PT: 11.00 sec;   INR: 0.97 ratio         PTT - ( 17 Jul 2024 08:20 )  PTT:29.7 sec    Anion Gap: 13 07-17 @ 08:20  CK: -- 07-17 @ 08:20  Troponin:  --  07-17 @ 08:20  Pro-BNP:  --  07-17 @ 08:20  VBG:  --  07-17 @ 08:20  Carboxyhemoglobin %:  --  07-17 @ 08:20  Methemoglobin %:  --  07-17 @ 08:20  Osmolality Serum:  --  07-17 @ 08:20  Aspirin Level: --  07-17 @ 08:20  Acetaminophen Level:  --  07-17 @ 08:20  Ethanol Level:  --  07-17 @ 08:20  Digoxin Level:  --  07-17 @ 08:20  Phenytoin Level:  --  07-17 @ 08:20  Carbamazepine level:  --  07-17 @ 08:20  Lamotrigine level:  --  07-17 @ 08:20

## 2024-07-17 NOTE — ED ADULT NURSE NOTE - ED COMFORT CARE
Patient informed/Family informed/Explanation of wait/Warm blanket/Mouth care/Pillow/Darkened room/TV

## 2024-07-17 NOTE — ED ADULT NURSE NOTE - NSFALLHARMRISKINTERV_ED_ALL_ED
Assistance OOB with selected safe patient handling equipment if applicable/Assistance with ambulation/Communicate risk of Fall with Harm to all staff, patient, and family/Monitor gait and stability/Monitor for mental status changes and reorient to person, place, and time, as needed/Provide visual cue: red socks, yellow wristband, yellow gown, etc/Reinforce activity limits and safety measures with patient and family/Toileting schedule using arm’s reach rule for commode and bathroom/Use of alarms - bed, stretcher, chair and/or video monitoring/Bed in lowest position, wheels locked, appropriate side rails in place/Call bell, personal items and telephone in reach/Instruct patient to call for assistance before getting out of bed/chair/stretcher/Non-slip footwear applied when patient is off stretcher/[place] to call system/Physically safe environment - no spills, clutter or unnecessary equipment/Purposeful Proactive Rounding/Room/bathroom lighting operational, light cord in reach

## 2024-07-17 NOTE — ED ADULT TRIAGE NOTE - CHIEF COMPLAINT QUOTE
Wife reports pt woke up this morning and was confused, scratching at the wall and kept falling but appeared asleep, she attempted to wake him but he was not making sense so she called 911. Wife reports he sometimes takes extra of his medication by accident. Pt able to tell RN his first name,  had slurred speech with all over questions. Weakness noted in all extremities. Stroke code activated in triage.

## 2024-07-17 NOTE — H&P ADULT - NSHPREVIEWOFSYSTEMS_GEN_ALL_CORE
REVIEW OF SYSTEMS:    CONSTITUTIONAL:  No weakness, fevers or chills  EYES/ENT:  No visual changes;  No vertigo or throat pain   NECK:  No pain or stiffness  RESPIRATORY:  No cough, wheezing, hemoptysis; No shortness of breath  CARDIOVASCULAR:  No chest pain or palpitations  GASTROINTESTINAL:  No abdominal or epigastric pain. No nausea, vomiting, or hematemesis; No diarrhea or constipation. No melena or hematochezia.  GENITOURINARY:  No dysuria, frequency or hematuria  MUSCULOSKELETAL:  FROM all extremities, normal strength, No calf tenderness  SKIN:  No itching, rashes  NEUROLOGIC: imbalance, abnormal gait, slurring speech, somnolence, erratic behavior

## 2024-07-17 NOTE — ED PROVIDER NOTE - PROGRESS NOTE DETAILS
Attending Statement:  I have personally seen the patient. MARI Fernández and I provided critical care for  a total of 35 minutes. I provided a substantive portion of the care and the majority of the critical care time.    HPI: Case endorsed to Dr. Tomlin to follow-up labs, reassess and dispo SD: consulted Toxicology. Recommending repeat EKG 3 hours from initial with observation, no intervention at this time. Patient signed out to by Dr. Triplett, follow-up labs, CT head, reassess and dispo.

## 2024-07-18 DIAGNOSIS — T50.901A POISONING BY UNSPECIFIED DRUGS, MEDICAMENTS AND BIOLOGICAL SUBSTANCES, ACCIDENTAL (UNINTENTIONAL), INITIAL ENCOUNTER: ICD-10-CM

## 2024-07-18 LAB
A1C WITH ESTIMATED AVERAGE GLUCOSE RESULT: 6 % — HIGH (ref 4–5.6)
ALBUMIN SERPL ELPH-MCNC: 4.1 G/DL — SIGNIFICANT CHANGE UP (ref 3.5–5.2)
ALP SERPL-CCNC: 63 U/L — SIGNIFICANT CHANGE UP (ref 30–115)
ALT FLD-CCNC: 19 U/L — SIGNIFICANT CHANGE UP (ref 0–41)
ANION GAP SERPL CALC-SCNC: 11 MMOL/L — SIGNIFICANT CHANGE UP (ref 7–14)
AST SERPL-CCNC: 15 U/L — SIGNIFICANT CHANGE UP (ref 0–41)
BASOPHILS # BLD AUTO: 0.05 K/UL — SIGNIFICANT CHANGE UP (ref 0–0.2)
BASOPHILS NFR BLD AUTO: 0.5 % — SIGNIFICANT CHANGE UP (ref 0–1)
BILIRUB SERPL-MCNC: 0.6 MG/DL — SIGNIFICANT CHANGE UP (ref 0.2–1.2)
BUN SERPL-MCNC: 19 MG/DL — SIGNIFICANT CHANGE UP (ref 10–20)
CALCIUM SERPL-MCNC: 8.5 MG/DL — SIGNIFICANT CHANGE UP (ref 8.4–10.5)
CHLORIDE SERPL-SCNC: 107 MMOL/L — SIGNIFICANT CHANGE UP (ref 98–110)
CO2 SERPL-SCNC: 26 MMOL/L — SIGNIFICANT CHANGE UP (ref 17–32)
CREAT SERPL-MCNC: 1 MG/DL — SIGNIFICANT CHANGE UP (ref 0.7–1.5)
EGFR: 89 ML/MIN/1.73M2 — SIGNIFICANT CHANGE UP
EOSINOPHIL # BLD AUTO: 0.43 K/UL — SIGNIFICANT CHANGE UP (ref 0–0.7)
EOSINOPHIL NFR BLD AUTO: 4 % — SIGNIFICANT CHANGE UP (ref 0–8)
ESTIMATED AVERAGE GLUCOSE: 126 MG/DL — HIGH (ref 68–114)
GLUCOSE BLDC GLUCOMTR-MCNC: 124 MG/DL — HIGH (ref 70–99)
GLUCOSE BLDC GLUCOMTR-MCNC: 138 MG/DL — HIGH (ref 70–99)
GLUCOSE BLDC GLUCOMTR-MCNC: 92 MG/DL — SIGNIFICANT CHANGE UP (ref 70–99)
GLUCOSE BLDC GLUCOMTR-MCNC: 95 MG/DL — SIGNIFICANT CHANGE UP (ref 70–99)
GLUCOSE SERPL-MCNC: 80 MG/DL — SIGNIFICANT CHANGE UP (ref 70–99)
HCT VFR BLD CALC: 43.5 % — SIGNIFICANT CHANGE UP (ref 42–52)
HGB BLD-MCNC: 14.8 G/DL — SIGNIFICANT CHANGE UP (ref 14–18)
IMM GRANULOCYTES NFR BLD AUTO: 0.6 % — HIGH (ref 0.1–0.3)
LYMPHOCYTES # BLD AUTO: 3.52 K/UL — HIGH (ref 1.2–3.4)
LYMPHOCYTES # BLD AUTO: 32.8 % — SIGNIFICANT CHANGE UP (ref 20.5–51.1)
MAGNESIUM SERPL-MCNC: 2.2 MG/DL — SIGNIFICANT CHANGE UP (ref 1.8–2.4)
MCHC RBC-ENTMCNC: 33.6 PG — HIGH (ref 27–31)
MCHC RBC-ENTMCNC: 34 G/DL — SIGNIFICANT CHANGE UP (ref 32–37)
MCV RBC AUTO: 98.6 FL — HIGH (ref 80–94)
MONOCYTES # BLD AUTO: 0.85 K/UL — HIGH (ref 0.1–0.6)
MONOCYTES NFR BLD AUTO: 7.9 % — SIGNIFICANT CHANGE UP (ref 1.7–9.3)
NEUTROPHILS # BLD AUTO: 5.81 K/UL — SIGNIFICANT CHANGE UP (ref 1.4–6.5)
NEUTROPHILS NFR BLD AUTO: 54.2 % — SIGNIFICANT CHANGE UP (ref 42.2–75.2)
NRBC # BLD: 0 /100 WBCS — SIGNIFICANT CHANGE UP (ref 0–0)
PHOSPHATE SERPL-MCNC: 2.8 MG/DL — SIGNIFICANT CHANGE UP (ref 2.1–4.9)
PLATELET # BLD AUTO: 218 K/UL — SIGNIFICANT CHANGE UP (ref 130–400)
PMV BLD: 10 FL — SIGNIFICANT CHANGE UP (ref 7.4–10.4)
POTASSIUM SERPL-MCNC: 3.5 MMOL/L — SIGNIFICANT CHANGE UP (ref 3.5–5)
POTASSIUM SERPL-SCNC: 3.5 MMOL/L — SIGNIFICANT CHANGE UP (ref 3.5–5)
PROT SERPL-MCNC: 6.2 G/DL — SIGNIFICANT CHANGE UP (ref 6–8)
RBC # BLD: 4.41 M/UL — LOW (ref 4.7–6.1)
RBC # FLD: 13.2 % — SIGNIFICANT CHANGE UP (ref 11.5–14.5)
SODIUM SERPL-SCNC: 144 MMOL/L — SIGNIFICANT CHANGE UP (ref 135–146)
WBC # BLD: 10.72 K/UL — SIGNIFICANT CHANGE UP (ref 4.8–10.8)
WBC # FLD AUTO: 10.72 K/UL — SIGNIFICANT CHANGE UP (ref 4.8–10.8)

## 2024-07-18 PROCEDURE — 93010 ELECTROCARDIOGRAM REPORT: CPT

## 2024-07-18 PROCEDURE — 99233 SBSQ HOSP IP/OBS HIGH 50: CPT

## 2024-07-18 PROCEDURE — 99451 NTRPROF PH1/NTRNET/EHR 5/>: CPT

## 2024-07-18 PROCEDURE — 71045 X-RAY EXAM CHEST 1 VIEW: CPT | Mod: 26

## 2024-07-18 RX ORDER — DEXTROSE MONOHYDRATE AND SODIUM CHLORIDE 5; .3 G/100ML; G/100ML
1000 INJECTION, SOLUTION INTRAVENOUS
Refills: 0 | Status: DISCONTINUED | OUTPATIENT
Start: 2024-07-18 | End: 2024-07-19

## 2024-07-18 RX ADMIN — Medication 50 MILLILITER(S): at 05:45

## 2024-07-18 RX ADMIN — ENOXAPARIN SODIUM 40 MILLIGRAM(S): 100 INJECTION SUBCUTANEOUS at 17:30

## 2024-07-18 RX ADMIN — Medication 1 APPLICATION(S): at 05:46

## 2024-07-18 RX ADMIN — DEXTROSE MONOHYDRATE AND SODIUM CHLORIDE 80 MILLILITER(S): 5; .3 INJECTION, SOLUTION INTRAVENOUS at 12:19

## 2024-07-18 RX ADMIN — Medication 750 MILLIGRAM(S): at 21:06

## 2024-07-18 NOTE — SWALLOW BEDSIDE ASSESSMENT ADULT - SLP PERTINENT HISTORY OF CURRENT PROBLEM
Patient is a 53 year old man with past medical history of TBI, headaches on amitriptyline, depression, insomnia presented to the ED brought in by his wife for altered mental status and stroke code this morning. The patient's spouse states that the patient regularly sleeps at 4AM, and when the wife woke up around 5:00 AM she noticed that the patient was "tossing and turning" in bed more than usually, and found that he was laying in a "abnormal position" as well. The patient's spouse then went on to say that the patient tried getting up from the bed, however his body was leaning side to side and then fell down from sitting position height with no head trauma. Patient's spouse then states that they patient went on to sleeping on the ground, and was unable to be woken up by the spouse who then called 911.

## 2024-07-18 NOTE — SWALLOW BEDSIDE ASSESSMENT ADULT - COMMENTS
Pt reports TBI 12 years ago. Pt denies any hx of dysphagia; however over the past year pt endorses memory loss, word finding difficulties and a stutter.

## 2024-07-18 NOTE — SWALLOW BEDSIDE ASSESSMENT ADULT - ADDITIONAL RECOMMENDATIONS
SLP provided outpatient speech tx information regarding changes in speech/cognition over the past year. ST acute services no longer needed. SLP d/c. Reconsult PRN

## 2024-07-18 NOTE — CHART NOTE - NSCHARTNOTEFT_GEN_A_CORE
TRANSFER NOTE (from CEU to FLOORS)      Hospital Course in CEU:  53 year old man with past medical history of TBI, headaches on amitriptyline, depression, insomnia presented to the ED for altered mental status, stroke workup (negative), and admitted for unintentional amitriptyline overdose, and further management.    #Unintentional drug overdose vs Neurologic etiology (low suspicion)  #AMS  #Prior h/o of unintentional doubling dosage of amitriptyline  - Initial presentation as per wife: somnolence, abnormal gait, imbalance, no response to voice or command, lethargy  - ED Vitals: /73  RR 18 T 97 F on NC at 2L   - CXR (7/17): Patchy bilateral opacifications. Low lung volumes leads to magnification of the pulmonary interstitium.  - CT Angio Neck, Head/Neck (7/17): No perfusion defect using standard threshold. No large vessel occlusion, stenosis, aneurysm, or vascular malformation. Prominent bilateral palatine tonsils. Mildly enlarged right level 2A and bilateral level 5 lymph nodes, nonspecific in etiology. Recommend nonemergent follow-up with ENT.  - CT Brain (7/17): No acute intracranial pathology. Further evaluation with MRI can be considered if the symptoms persist (if no contraindication).  - Stroke w/up negative  - ECG normal, QTc 477, keep monitoring.  - pt has constipation, urine retention (800c on [place]), dry mouth, thirsty.   - as per Neuro no further intervention, they signed out  - continue to monitor patient for change in mental status, hypotension, arrythmias.  - Urine tox negative, low salicylate/acetaminophen/ethanol levels  - stat bicarb 100 IV, was given at ED.  - seizure precautions  - F/u Psychiatry consult.  - Review his home medications from the wife for a possible drug interaction.    DVT Prophylaxis: Lovenox  Diet: regular    ** Since patient is stable and talking, ECG and vitals are stable. He is ready to be downgraded to floors.    Handoff:   - keep monitoring ECG.  - F/u inpatient psychiatry consult.  - Review his home medications from the wife for a possible drug interaction.        VITALS  Vital Signs Last 24 Hrs  T(C): 36.1 (18 Jul 2024 08:00), Max: 37.1 (18 Jul 2024 00:00)  T(F): 97 (18 Jul 2024 08:00), Max: 98.7 (18 Jul 2024 00:00)  HR: 94 (18 Jul 2024 08:00) (94 - 106)  BP: 152/68 (18 Jul 2024 08:00) (125/65 - 171/79)  BP(mean): 95 (18 Jul 2024 08:00) (94 - 116)  RR: 20 (18 Jul 2024 08:00) (18 - 20)  SpO2: 91% (18 Jul 2024 08:00) (91% - 96%)    Parameters below as of 18 Jul 2024 08:00  Patient On (Oxygen Delivery Method): room air    CAPILLARY BLOOD GLUCOSE    POCT Blood Glucose.: 92 mg/dL (18 Jul 2024 07:39)  POCT Blood Glucose.: 106 mg/dL (17 Jul 2024 21:37)  POCT Blood Glucose.: 101 mg/dL (17 Jul 2024 17:40)      PHYSICAL EXAM  General: A&Ox3; NAD  Head: NC/AT; MMM; PERRL; EOMI;  Neck: Supple; no JVD  Respiratory: CTA B/L; no wheezes/crackles   Cardiovascular: Regular rhythm/rate; S1/S2   Gastrointestinal: Soft; NTND; normoactive BS  Extremities: WWP; no edema/cyanosis  Neurological:  CNII-XII grossly intact; no obvious focal deficits    MEDICATIONS  (STANDING):  chlorhexidine 2% Cloths 1 Application(s) Topical <User Schedule>  dextrose 5%. 1000 milliLiter(s) (50 mL/Hr) IV Continuous <Continuous>  dextrose 5%. 1000 milliLiter(s) (100 mL/Hr) IV Continuous <Continuous>  dextrose 50% Injectable 25 Gram(s) IV Push once  dextrose 50% Injectable 12.5 Gram(s) IV Push once  dextrose 50% Injectable 25 Gram(s) IV Push once  enoxaparin Injectable 40 milliGRAM(s) SubCutaneous every 24 hours  glucagon  Injectable 1 milliGRAM(s) IntraMuscular once  insulin lispro (ADMELOG) corrective regimen sliding scale   SubCutaneous three times a day before meals  lactated ringers. 1000 milliLiter(s) (80 mL/Hr) IV Continuous <Continuous>  methocarbamol 750 milliGRAM(s) Oral at bedtime  sodium chloride 0.9%. 1000 milliLiter(s) (50 mL/Hr) IV Continuous <Continuous>    MEDICATIONS  (PRN):  dextrose Oral Gel 15 Gram(s) Oral once PRN Blood Glucose LESS THAN 70 milliGRAM(s)/deciliter  meclizine 25 milliGRAM(s) Oral three times a day PRN for dizziness  SUMAtriptan 100 milliGRAM(s) Oral daily PRN for headache      No Known Allergies      LABS                        14.8   10.72 )-----------( 218      ( 18 Jul 2024 06:00 )             43.5     07-18    144  |  107  |  19  ----------------------------<  80  3.5   |  26  |  1.0    Ca    8.5      18 Jul 2024 06:00  Phos  2.8     07-18  Mg     2.2     07-18    TPro  6.2  /  Alb  4.1  /  TBili  0.6  /  DBili  x   /  AST  15  /  ALT  19  /  AlkPhos  63  07-18    PT/INR - ( 17 Jul 2024 08:20 )   PT: 11.00 sec;   INR: 0.97 ratio         PTT - ( 17 Jul 2024 08:20 )  PTT:29.7 sec  Urinalysis Basic - ( 18 Jul 2024 06:00 )    Color: x / Appearance: x / SG: x / pH: x  Gluc: 80 mg/dL / Ketone: x  / Bili: x / Urobili: x   Blood: x / Protein: x / Nitrite: x   Leuk Esterase: x / RBC: x / WBC x   Sq Epi: x / Non Sq Epi: x / Bacteria: x            IMAGING/EKG/ETC  EKG:  Xray:  CT:  MRI:

## 2024-07-18 NOTE — SWALLOW BEDSIDE ASSESSMENT ADULT - SLP GENERAL OBSERVATIONS
Pt found laying in bed a&ox4. Pt reports TBI 12 years ago. Pt denies any hx of dysphagia; however over the past year pt endorses memory loss, word finding difficulties and a stutter.

## 2024-07-19 ENCOUNTER — TRANSCRIPTION ENCOUNTER (OUTPATIENT)
Age: 54
End: 2024-07-19

## 2024-07-19 VITALS
RESPIRATION RATE: 18 BRPM | DIASTOLIC BLOOD PRESSURE: 79 MMHG | HEART RATE: 100 BPM | OXYGEN SATURATION: 94 % | TEMPERATURE: 98 F | SYSTOLIC BLOOD PRESSURE: 146 MMHG

## 2024-07-19 LAB
ALBUMIN SERPL ELPH-MCNC: 4 G/DL — SIGNIFICANT CHANGE UP (ref 3.5–5.2)
ALP SERPL-CCNC: 83 U/L — SIGNIFICANT CHANGE UP (ref 30–115)
ALT FLD-CCNC: 20 U/L — SIGNIFICANT CHANGE UP (ref 0–41)
ANION GAP SERPL CALC-SCNC: 12 MMOL/L — SIGNIFICANT CHANGE UP (ref 7–14)
AST SERPL-CCNC: 15 U/L — SIGNIFICANT CHANGE UP (ref 0–41)
BASOPHILS # BLD AUTO: 0.06 K/UL — SIGNIFICANT CHANGE UP (ref 0–0.2)
BASOPHILS NFR BLD AUTO: 0.5 % — SIGNIFICANT CHANGE UP (ref 0–1)
BILIRUB SERPL-MCNC: 0.6 MG/DL — SIGNIFICANT CHANGE UP (ref 0.2–1.2)
BUN SERPL-MCNC: 15 MG/DL — SIGNIFICANT CHANGE UP (ref 10–20)
CALCIUM SERPL-MCNC: 8.3 MG/DL — LOW (ref 8.4–10.5)
CHLORIDE SERPL-SCNC: 104 MMOL/L — SIGNIFICANT CHANGE UP (ref 98–110)
CO2 SERPL-SCNC: 26 MMOL/L — SIGNIFICANT CHANGE UP (ref 17–32)
CREAT SERPL-MCNC: 0.9 MG/DL — SIGNIFICANT CHANGE UP (ref 0.7–1.5)
EGFR: 101 ML/MIN/1.73M2 — SIGNIFICANT CHANGE UP
EOSINOPHIL # BLD AUTO: 0.29 K/UL — SIGNIFICANT CHANGE UP (ref 0–0.7)
EOSINOPHIL NFR BLD AUTO: 2.4 % — SIGNIFICANT CHANGE UP (ref 0–8)
GLUCOSE BLDC GLUCOMTR-MCNC: 109 MG/DL — HIGH (ref 70–99)
GLUCOSE BLDC GLUCOMTR-MCNC: 110 MG/DL — HIGH (ref 70–99)
GLUCOSE BLDC GLUCOMTR-MCNC: 113 MG/DL — HIGH (ref 70–99)
GLUCOSE SERPL-MCNC: 89 MG/DL — SIGNIFICANT CHANGE UP (ref 70–99)
HCT VFR BLD CALC: 45.9 % — SIGNIFICANT CHANGE UP (ref 42–52)
HGB BLD-MCNC: 15.3 G/DL — SIGNIFICANT CHANGE UP (ref 14–18)
IMM GRANULOCYTES NFR BLD AUTO: 0.3 % — SIGNIFICANT CHANGE UP (ref 0.1–0.3)
LYMPHOCYTES # BLD AUTO: 2.58 K/UL — SIGNIFICANT CHANGE UP (ref 1.2–3.4)
LYMPHOCYTES # BLD AUTO: 21.5 % — SIGNIFICANT CHANGE UP (ref 20.5–51.1)
MAGNESIUM SERPL-MCNC: 2.4 MG/DL — SIGNIFICANT CHANGE UP (ref 1.8–2.4)
MCHC RBC-ENTMCNC: 33 PG — HIGH (ref 27–31)
MCHC RBC-ENTMCNC: 33.3 G/DL — SIGNIFICANT CHANGE UP (ref 32–37)
MCV RBC AUTO: 98.9 FL — HIGH (ref 80–94)
MONOCYTES # BLD AUTO: 0.99 K/UL — HIGH (ref 0.1–0.6)
MONOCYTES NFR BLD AUTO: 8.3 % — SIGNIFICANT CHANGE UP (ref 1.7–9.3)
NEUTROPHILS # BLD AUTO: 8.04 K/UL — HIGH (ref 1.4–6.5)
NEUTROPHILS NFR BLD AUTO: 67 % — SIGNIFICANT CHANGE UP (ref 42.2–75.2)
NRBC # BLD: 0 /100 WBCS — SIGNIFICANT CHANGE UP (ref 0–0)
PHOSPHATE SERPL-MCNC: 2.5 MG/DL — SIGNIFICANT CHANGE UP (ref 2.1–4.9)
PLATELET # BLD AUTO: 242 K/UL — SIGNIFICANT CHANGE UP (ref 130–400)
PMV BLD: 10.4 FL — SIGNIFICANT CHANGE UP (ref 7.4–10.4)
POTASSIUM SERPL-MCNC: 3.7 MMOL/L — SIGNIFICANT CHANGE UP (ref 3.5–5)
POTASSIUM SERPL-SCNC: 3.7 MMOL/L — SIGNIFICANT CHANGE UP (ref 3.5–5)
PROT SERPL-MCNC: 6.5 G/DL — SIGNIFICANT CHANGE UP (ref 6–8)
RBC # BLD: 4.64 M/UL — LOW (ref 4.7–6.1)
RBC # FLD: 13.1 % — SIGNIFICANT CHANGE UP (ref 11.5–14.5)
SODIUM SERPL-SCNC: 142 MMOL/L — SIGNIFICANT CHANGE UP (ref 135–146)
WBC # BLD: 12 K/UL — HIGH (ref 4.8–10.8)
WBC # FLD AUTO: 12 K/UL — HIGH (ref 4.8–10.8)

## 2024-07-19 PROCEDURE — 90792 PSYCH DIAG EVAL W/MED SRVCS: CPT | Mod: 95

## 2024-07-19 RX ORDER — SUMATRIPTAN SUCCINATE 25 MG/1
100 TABLET, FILM COATED ORAL
Qty: 0 | Refills: 0 | DISCHARGE

## 2024-07-19 RX ADMIN — Medication 1 APPLICATION(S): at 05:44

## 2024-07-19 NOTE — BH CONSULTATION LIAISON ASSESSMENT NOTE - NSSUICRSKFACTOR_PSY_ALL_CORE
· Taking Myrbetriq    · Continues with complaints and again declines Uro-Gyn referral  Activating Events/Stressors

## 2024-07-19 NOTE — DISCHARGE NOTE NURSING/CASE MANAGEMENT/SOCIAL WORK - NSDCPEFALRISK_GEN_ALL_CORE
For information on Fall & Injury Prevention, visit: [email or web address] OR  [email or web address] OR  https://www.cdc.gov/steadi/patient.html

## 2024-07-19 NOTE — DISCHARGE NOTE PROVIDER - NSDCMRMEDTOKEN_GEN_ALL_CORE_FT
amitriptyline: 100 milligram(s) orally once a day (at bedtime)  meclizine 25 mg oral tablet: 1 tab(s) orally 3 times a day as needed for  dizziness  methocarbamol 750 mg oral tablet: 1 tab(s) orally once a day (at bedtime)  Sumatriptan: 100 milligram(s) orally as needed for  headache Sumatriptan Succinate 100 mg orally @ onset of headache, may repeat in 2 hours if needed.   amitriptyline: 100 milligram(s) orally once a day (at bedtime)  meclizine 25 mg oral tablet: 1 tab(s) orally 3 times a day as needed for  dizziness  methocarbamol 750 mg oral tablet: 1 tab(s) orally once a day (at bedtime)  Sumatriptan: 100 milligram(s) orally 2 times a day as needed for  headache

## 2024-07-19 NOTE — BH CONSULTATION LIAISON ASSESSMENT NOTE - NSBHCONSULTFOLLOWAFTERCARE_PSY_A_CORE FT
f/u with outpatient neurologist  pt may also f/u with [facility]  [address] [place] [id number]  [phone]

## 2024-07-19 NOTE — DISCHARGE NOTE PROVIDER - CARE PROVIDER_API CALL
Charli Miller  Internal Medicine  [address] [place] [id number]  Phone: [phone]  Fax: [phone]  Follow Up Time: 1 month

## 2024-07-19 NOTE — BH CONSULTATION LIAISON ASSESSMENT NOTE - CURRENT MEDICATION
MEDICATIONS  (STANDING):  chlorhexidine 2% Cloths 1 Application(s) Topical <User Schedule>  dextrose 5%. 1000 milliLiter(s) (100 mL/Hr) IV Continuous <Continuous>  dextrose 5%. 1000 milliLiter(s) (50 mL/Hr) IV Continuous <Continuous>  dextrose 50% Injectable 12.5 Gram(s) IV Push once  dextrose 50% Injectable 25 Gram(s) IV Push once  dextrose 50% Injectable 25 Gram(s) IV Push once  enoxaparin Injectable 40 milliGRAM(s) SubCutaneous every 24 hours  glucagon  Injectable 1 milliGRAM(s) IntraMuscular once  insulin lispro (ADMELOG) corrective regimen sliding scale   SubCutaneous three times a day before meals  lactated ringers. 1000 milliLiter(s) (80 mL/Hr) IV Continuous <Continuous>  methocarbamol 750 milliGRAM(s) Oral at bedtime  sodium chloride 0.9%. 1000 milliLiter(s) (50 mL/Hr) IV Continuous <Continuous>    MEDICATIONS  (PRN):  dextrose Oral Gel 15 Gram(s) Oral once PRN Blood Glucose LESS THAN 70 milliGRAM(s)/deciliter  meclizine 25 milliGRAM(s) Oral three times a day PRN for dizziness  SUMAtriptan 100 milliGRAM(s) Oral daily PRN for headache

## 2024-07-19 NOTE — BH CONSULTATION LIAISON ASSESSMENT NOTE - RISK ASSESSMENT
denies suicidal ideation. denies intentional overdose. no prior self harm/suicide attempts/psychiatric history  , domiciled, has social supports, responsible to kids  has outpatient providers

## 2024-07-19 NOTE — DISCHARGE NOTE PROVIDER - NSDCCPCAREPLAN_GEN_ALL_CORE_FT
PRINCIPAL DISCHARGE DIAGNOSIS  Diagnosis: Altered mental status  Assessment and Plan of Treatment: Patient had  altered mental status most likley due to extra dose of amytryptaline. Patient was stable on the floor and mental status improved significantly on exam     PRINCIPAL DISCHARGE DIAGNOSIS  Diagnosis: Altered mental status  Assessment and Plan of Treatment: Patient had  altered mental status most likley due to extra dose of amytryptaline. Patient was stable on the floor and mental status improved significantly on exam.   Please donot take any extra amitryptaline pills.   please follow up with your outpatient psychiatrist.   You are stable to be discharged.

## 2024-07-19 NOTE — DISCHARGE NOTE PROVIDER - NSDCFUSCHEDAPPT_GEN_ALL_CORE_FT
[facility]  [address]  Scheduled Appointment: 09/17/2024

## 2024-07-19 NOTE — PROGRESS NOTE ADULT - ASSESSMENT
53 year old man with past medical history of TBI, headaches on amitriptyline, depression, insomnia presented to the ED for altered mental status, stroke workup, and admitted for unintentional drug overdose likely 2/2 to amitriptyline and further management.       A/P   #Unintentional drug overdose vs polypharmacy / AMS   - AMS resolved, pt is at baseline, awake and alert  - reconcile home medications ( pt f/u with pain management,  neurology as an OP, not sure which pain medications he takes at home)   - stroke code was called on admission, which was ruled out   - as per Neuro no further intervention  - as per Toxicology team supportive care  - No need for IPP per psych  - c/w IV hydration for one more day , reevaluate in 24 hours   - Urine tox negative, low salicylate/acetaminophen/ethanol levels  - TOV  - monitor output     # h/o TBI /  Depression / h/o Migraines Vertigo   - c/w supportive care     # Chronic pain  - c/w Robaxin  - Resume home meds    DVT Prophylaxis: Lovenox    TIFFANIE planning
53 year old man with past medical history of TBI, headaches on amitriptyline, depression, insomnia presented to the ED for altered mental status, stroke workup, and admitted for unintentional drug overdose likely 2/2 to amitriptyline and further management.       A/P   #Unintentional drug overdose vs polypharmacy / AMS   - AMS resolved, pt is at baseline, awake and alert today   - reconcile home medications ( pt f/u with pain management,  neurology as an OP, not sure which pain medications he takes at home)   - stroke code was called on admission, which was ruled out   - as per Neuro no further intervention  - as per Toxicology team supportive care  - c/w IV hydration for one more day , reevaluate in 24 hours   - Urine tox negative, low salicylate/acetaminophen/ethanol levels  - consider TOV in 24 hours   - monitor output     # h/o TBI /  Depression / h/o Migraines Vertigo   - c/w supportive care   - hold home meds except muscle relaxants, Meclizine   - consult psychiatry     # Chronic pain  - f/u with pt's wife ( I asked her to verify all home medications ) or confirm with pt's pharmacy   - c/w Robaxin  - may start Lidoderm topical for now       DVT Prophylaxis: Lovenox    #Progress Note Handoff  Pending (specify):  IV hydration for one more day, TOV in 24 hours, reconcile home medications, hold Amitriptyline ( no clear indications ), consult psychiatry, anticipate discharge in 24 hours    Family discussion: I spoke with pt and his wife at length today, they agreed with a plan of care   Disposition: [facility] 
IMPRESSION:    AMS  Suspected TCA overdose  Mediastinal LAD  TBI  Migraines on amitriptyline  Active smoker    PLAN:    CNS: Stroke w/up negative so far.  Urine and serum toxicology.     HEENT: Oral care.  On end-tidal CO2.    PULMONARY:  HOB @ 45 degrees.  Aspiration precautions.  on RA    CARDIOVASCULAR: TTE.  FUP QT    GI: GI prophylaxis. feeding    RENAL:  Follow up lytes.  Correct as needed    INFECTIOUS DISEASE: Monitor off Abx.    HEMATOLOGICAL:  DVT prophylaxis.    ENDOCRINE:  Follow up FS.  Insulin protocol if needed.    MUSCULOSKELETAL: AAT    FLOOR

## 2024-07-19 NOTE — DISCHARGE NOTE PROVIDER - HOSPITAL COURSE
53 year old man with past medical history of TBI, headaches on amitriptyline, depression, insomnia presented to the ED brought in by his wife for altered mental status and stroke code this morning. The patient's spouse states that the patient regularly sleeps at 4AM, and when the wife woke up around 5:00 AM she noticed that the patient was "tossing and turning" in bed more than usually, and found that he was laying in a "abnormal position" as well. The patient's spouse then went on to say that the patient tried getting up from the bed, however his body was leaning side to side and then fell down from sitting position height with no head trauma. Patient's spouse then states that they patient went on to sleeping on the ground. Wife was concerned he may have taken extra dose of amitriptyline as he has done this before.  On the medicine floor amitriptyline was held. Patient clonically improved off amitriptyline. Psych was consulted for concern of suicidality ***   53 year old man with past medical history of TBI, headaches on amitriptyline, depression, insomnia presented to the ED brought in by his wife for altered mental status and stroke code this morning. The patient's spouse states that the patient regularly sleeps at 4AM, and when the wife woke up around 5:00 AM she noticed that the patient was "tossing and turning" in bed more than usually, and found that he was laying in a "abnormal position" as well. The patient's spouse then went on to say that the patient tried getting up from the bed, however his body was leaning side to side and then fell down from sitting position height with no head trauma. Patient's spouse then states that they patient went on to sleeping on the ground. As per the wife patient sometimes forgets that he already took his medication which is why he accidentally takes another dosage. Patient's wife states that during those episodes, the patient becomes very sleepy, tired, and "out of state" and states that resolves within a few hours. On the medicine floor amitriptyline was held. Patient clonically improved off amitriptyline. Psych was consulted for concern of suicidality ***   53 year old man with past medical history of TBI, headaches on amitriptyline, depression, insomnia presented to the ED brought in by his wife for altered mental status and stroke code this morning. The patient's spouse states that the patient regularly sleeps at 4AM, and when the wife woke up around 5:00 AM she noticed that the patient was "tossing and turning" in bed more than usually, and found that he was laying in a "abnormal position" as well. The patient's spouse then went on to say that the patient tried getting up from the bed, however his body was leaning side to side and then fell down from sitting position height with no head trauma. Patient's spouse then states that they patient went on to sleeping on the ground. As per the wife patient sometimes forgets that he already took his medication which is why he accidentally takes another dosage. Patient's wife states that during those episodes, the patient becomes very sleepy, tired, and "out of state" and states that resolves within a few hours.     Hospital course:  On the medicine floor amitriptyline was held. Patient clonically improved off amitriptyline. He passed trial of void. Psych was consulted for concern of suicidality ***   53 year old man with past medical history of TBI, headaches on amitriptyline, depression, insomnia presented to the ED brought in by his wife for altered mental status and stroke code this morning. The patient's spouse states that the patient regularly sleeps at 4AM, and when the wife woke up around 5:00 AM she noticed that the patient was "tossing and turning" in bed more than usually, and found that he was laying in a "abnormal position" as well. The patient's spouse then went on to say that the patient tried getting up from the bed, however his body was leaning side to side and then fell down from sitting position height with no head trauma. Patient's spouse then states that they patient went on to sleeping on the ground. As per the wife patient sometimes forgets that he already took his medication which is why he accidentally takes another dosage. Patient's wife states that during those episodes, the patient becomes very sleepy, tired, and "out of state" and states that resolves within a few hours.     Hospital course:  On the medicine floor amitriptyline was held. Patient clonically improved off amitriptyline. He passed trial of void. Psych was consulted for concern of suicidality deemed low risk. Outpatient follow up.
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
## 2024-07-19 NOTE — PROGRESS NOTE ADULT - SUBJECTIVE AND OBJECTIVE BOX
Over Night Events: Events noted, feels better, afebrile, tox noted    PHYSICAL EXAM    ICU Vital Signs Last 24 Hrs  T(C): 36.7 (18 Jul 2024 04:00), Max: 37.1 (18 Jul 2024 00:00)  T(F): 98 (18 Jul 2024 04:00), Max: 98.7 (18 Jul 2024 00:00)  HR: 97 (18 Jul 2024 04:00) (97 - 118)  BP: 171/79 (18 Jul 2024 04:00) (125/65 - 171/79)  BP(mean): 94 (17 Jul 2024 20:00) (94 - 116)  RR: 20 (18 Jul 2024 04:00) (14 - 20)  SpO2: 93% (18 Jul 2024 04:00) (93% - 97%)    O2 Parameters below as of 18 Jul 2024 04:00  Patient On (Oxygen Delivery Method): room air            General: comfortable on RA  Lungs: Dec bs both bases  Cardiovascular: Regular   Abdomen: Soft, Positive BS  Extremities: No clubbing   Skin: Warm  Neurological: Non focal, follows commands      07-17-24 @ 07:01  -  07-18-24 @ 07:00  --------------------------------------------------------  IN:    Lactated Ringers Bolus: 1000 mL  Total IN: 1000 mL    OUT:    Intermittent Catheterization - Urethral (mL): 150 mL    Ureteral Catheter (mL): 1700 mL    Voided (mL): 800 mL  Total OUT: 2650 mL    Total NET: -1650 mL          LABS:                          14.8   10.72 )-----------( 218      ( 18 Jul 2024 06:00 )             43.5                                               07-17    140  |  102  |  17  ----------------------------<  114<H>  4.2   |  25  |  1.0    Ca    9.6      17 Jul 2024 08:20    TPro  6.8  /  Alb  4.4  /  TBili  0.2  /  DBili  x   /  AST  22  /  ALT  22  /  AlkPhos  78  07-17      PT/INR - ( 17 Jul 2024 08:20 )   PT: 11.00 sec;   INR: 0.97 ratio         PTT - ( 17 Jul 2024 08:20 )  PTT:29.7 sec                                       Urinalysis Basic - ( 17 Jul 2024 08:20 )    Color: x / Appearance: x / SG: x / pH: x  Gluc: 114 mg/dL / Ketone: x  / Bili: x / Urobili: x   Blood: x / Protein: x / Nitrite: x   Leuk Esterase: x / RBC: x / WBC x   Sq Epi: x / Non Sq Epi: x / Bacteria: x                                                  LIVER FUNCTIONS - ( 17 Jul 2024 08:20 )  Alb: 4.4 g/dL / Pro: 6.8 g/dL / ALK PHOS: 78 U/L / ALT: 22 U/L / AST: 22 U/L / GGT: x                                                                                                                                       MEDICATIONS  (STANDING):  chlorhexidine 2% Cloths 1 Application(s) Topical <User Schedule>  dextrose 5%. 1000 milliLiter(s) (100 mL/Hr) IV Continuous <Continuous>  dextrose 5%. 1000 milliLiter(s) (50 mL/Hr) IV Continuous <Continuous>  dextrose 50% Injectable 25 Gram(s) IV Push once  dextrose 50% Injectable 25 Gram(s) IV Push once  dextrose 50% Injectable 12.5 Gram(s) IV Push once  enoxaparin Injectable 40 milliGRAM(s) SubCutaneous every 24 hours  glucagon  Injectable 1 milliGRAM(s) IntraMuscular once  insulin lispro (ADMELOG) corrective regimen sliding scale   SubCutaneous three times a day before meals  methocarbamol 750 milliGRAM(s) Oral at bedtime  sodium chloride 0.9%. 1000 milliLiter(s) (50 mL/Hr) IV Continuous <Continuous>    MEDICATIONS  (PRN):  dextrose Oral Gel 15 Gram(s) Oral once PRN Blood Glucose LESS THAN 70 milliGRAM(s)/deciliter  meclizine 25 milliGRAM(s) Oral three times a day PRN for dizziness  SUMAtriptan 100 milliGRAM(s) Oral daily PRN for headache          
  FACUNDOLETI  53y, Male  Allergy: No Known Allergies    Hospital Day: 2d    Patient seen and examined. No acute events overnight    PMH/PSH:  PAST MEDICAL & SURGICAL HISTORY:  TBI (traumatic brain injury)      Chronic headaches      Current smoker      Major depression      Insomnia      Vertigo      S/P cervical discectomy      S/P wrist surgery          VITALS:  T(F): 98.1 (07-19-24 @ 14:46), Max: 98.5 (07-18-24 @ 19:53)  HR: 100 (07-19-24 @ 14:46)  BP: 146/79 (07-19-24 @ 14:46) (125/72 - 146/79)  RR: 18 (07-19-24 @ 14:46)  SpO2: 94% (07-19-24 @ 14:46)    TESTS & MEASUREMENTS:  Weight (Kg):   BMI (kg/m2): 34.1 (07-17)    07-17-24 @ 07:01  -  07-18-24 @ 07:00  --------------------------------------------------------  IN: 1000 mL / OUT: 2650 mL / NET: -1650 mL    07-18-24 @ 07:01  -  07-19-24 @ 07:00  --------------------------------------------------------  IN: 240 mL / OUT: 1650 mL / NET: -1410 mL    07-19-24 @ 07:01  -  07-19-24 @ 16:19  --------------------------------------------------------  IN: 0 mL / OUT: 200 mL / NET: -200 mL                            15.3   12.00 )-----------( 242      ( 19 Jul 2024 05:42 )             45.9       07-19    142  |  104  |  15  ----------------------------<  89  3.7   |  26  |  0.9    Ca    8.3<L>      19 Jul 2024 05:42  Phos  2.5     07-19  Mg     2.4     07-19    TPro  6.5  /  Alb  4.0  /  TBili  0.6  /  DBili  x   /  AST  15  /  ALT  20  /  AlkPhos  83  07-19    LIVER FUNCTIONS - ( 19 Jul 2024 05:42 )  Alb: 4.0 g/dL / Pro: 6.5 g/dL / ALK PHOS: 83 U/L / ALT: 20 U/L / AST: 15 U/L / GGT: x                 Urinalysis Basic - ( 19 Jul 2024 05:42 )    Color: x / Appearance: x / SG: x / pH: x  Gluc: 89 mg/dL / Ketone: x  / Bili: x / Urobili: x   Blood: x / Protein: x / Nitrite: x   Leuk Esterase: x / RBC: x / WBC x   Sq Epi: x / Non Sq Epi: x / Bacteria: x        RADIOLOGY & ADDITIONAL TESTS:    RECENT DIAGNOSTIC ORDERS:      MEDICATIONS:  MEDICATIONS  (STANDING):  chlorhexidine 2% Cloths 1 Application(s) Topical <User Schedule>  dextrose 5%. 1000 milliLiter(s) (50 mL/Hr) IV Continuous <Continuous>  dextrose 5%. 1000 milliLiter(s) (100 mL/Hr) IV Continuous <Continuous>  dextrose 50% Injectable 25 Gram(s) IV Push once  dextrose 50% Injectable 12.5 Gram(s) IV Push once  dextrose 50% Injectable 25 Gram(s) IV Push once  enoxaparin Injectable 40 milliGRAM(s) SubCutaneous every 24 hours  glucagon  Injectable 1 milliGRAM(s) IntraMuscular once  insulin lispro (ADMELOG) corrective regimen sliding scale   SubCutaneous three times a day before meals  lactated ringers. 1000 milliLiter(s) (80 mL/Hr) IV Continuous <Continuous>  methocarbamol 750 milliGRAM(s) Oral at bedtime  sodium chloride 0.9%. 1000 milliLiter(s) (50 mL/Hr) IV Continuous <Continuous>    MEDICATIONS  (PRN):  dextrose Oral Gel 15 Gram(s) Oral once PRN Blood Glucose LESS THAN 70 milliGRAM(s)/deciliter  meclizine 25 milliGRAM(s) Oral three times a day PRN for dizziness  SUMAtriptan 100 milliGRAM(s) Oral daily PRN for headache      HOME MEDICATIONS:  amitriptyline (07-17)  meclizine 25 mg oral tablet (07-17)  methocarbamol 750 mg oral tablet (07-17)  Sumatriptan (07-17)      REVIEW OF SYSTEMS:  All other review of systems is negative unless indicated above.     PHYSICAL EXAM:  PHYSICAL EXAM:  GENERAL: NAD  HEAD:  Atraumatic, Normocephalic  NECK: Supple, No JVD  CHEST/LUNG: Clear to auscultation bilaterally; No wheeze  HEART: Regular rate and rhythm; No murmurs, rubs, or gallops  ABDOMEN: Soft, Nontender, Nondistended; Bowel sounds present  EXTREMITIES:  2+ Peripheral Pulses, No clubbing, cyanosis, or edema  SKIN: No rashes or lesions      
53 year old man with past medical history of TBI, headaches on amitriptyline, depression, insomnia presented to the ED brought in by his wife for altered mental status and stroke code this morning. The patient's spouse states that the patient regularly sleeps at 4AM, and when the wife woke up around 5:00 AM she noticed that the patient was "tossing and turning" in bed more than usually, and found that he was laying in a "abnormal position" as well. The patient's spouse then went on to say that the patient tried getting up from the bed, however his body was leaning side to side and then fell down from sitting position height with no head trauma. Patient's spouse then states that they patient went on to sleeping on the ground, and was unable to be woken up by the spouse who then called 911.  Patient spouse endorses somnolence and increased sleepiness. States that the patient also has migraines with phonophobia on the right ear as well as constipation but states they are chronic conditions. States that an event like this has never happen previously. States that the patient is able to take medications on his own, and in the past has accidentaly taken a double dose of amitrptyline before unintentionally. States that the patient sometimes forgets that he already took his medication which is why he accidentally takes another dosage.   Pt was admitted to SDU for suspected Amitriptyline overdose vs polypharmacy, started on IV hydration. He retained 800 ml of urine, Quinones cath was put in.   Today pt is awake and alert, he has no recollection of the time when he took medications, he c/o constipation has no other complaints.     PAST MEDICAL & SURGICAL HISTORY:  TBI (traumatic brain injury)  Chronic headaches  Current smoker  Major depression  Insomnia  Vertigo  S/P cervical discectomy  S/P wrist surgery      Vital Signs Last 24 Hrs  T(C): 36.4 (18 Jul 2024 13:05), Max: 37.1 (18 Jul 2024 00:00)  T(F): 97.6 (18 Jul 2024 13:05), Max: 98.7 (18 Jul 2024 00:00)  HR: 100 (18 Jul 2024 13:05) (94 - 104)  BP: 145/73 (18 Jul 2024 13:05) (131/73 - 171/79)  BP(mean): 97 (18 Jul 2024 13:05) (94 - 97)  RR: 18 (18 Jul 2024 13:05) (18 - 20)  SpO2: 92% (18 Jul 2024 13:05) (91% - 94%)    Parameters below as of 18 Jul 2024 13:05  Patient On (Oxygen Delivery Method): room air      PHYSICAL EXAM:  GENERAL: NAD, obese   HEAD:  Atraumatic, Normocephalic  EYES: EOMI, PERRLA, conjunctiva and sclera clear  ENMT: No tonsillar erythema, exudates, or enlargement; Moist mucous membranes, Good dentition, No lesions  NECK: Supple, No JVD, Normal thyroid  NERVOUS SYSTEM:  Alert & Oriented X3, Good concentration; Motor Strength 5/5 B/L upper and lower extremities; DTRs 2+ intact and symmetric  CHEST/LUNG: decreased BS at bases   HEART: Regular rate and rhythm; No murmurs, rubs, or gallops  ABDOMEN: Soft, Nontender, distended; diminished BS , Quinones in place with dark yellow urine   EXTREMITIES:  2+ Peripheral Pulses, No clubbing, cyanosis, or edema      LABS:                        14.8   10.72 )-----------( 218      ( 18 Jul 2024 06:00 )             43.5     07-18    144  |  107  |  19  ----------------------------<  80  3.5   |  26  |  1.0    Ca    8.5      18 Jul 2024 06:00  Phos  2.8     07-18  Mg     2.2     07-18    TPro  6.2  /  Alb  4.1  /  TBili  0.6  /  DBili  x   /  AST  15  /  ALT  19  /  AlkPhos  63  07-18    PT/INR - ( 17 Jul 2024 08:20 )   PT: 11.00 sec;   INR: 0.97 ratio         PTT - ( 17 Jul 2024 08:20 )  PTT:29.7 sec  Urinalysis Basic - ( 18 Jul 2024 06:00 )    Color: x / Appearance: x / SG: x / pH: x  Gluc: 80 mg/dL / Ketone: x  / Bili: x / Urobili: x   Blood: x / Protein: x / Nitrite: x   Leuk Esterase: x / RBC: x / WBC x   Sq Epi: x / Non Sq Epi: x / Bacteria: x      RADIOLOGY & ADDITIONAL TESTS:  < from: Xray Chest 1 View- PORTABLE-Routine (Xray Chest 1 View- PORTABLE-Routine in AM.) (07.18.24 @ 04:11) >    Impression:    No radiographic evidence of acute cardiopulmonary disease.      < from: CT Brain Stroke Protocol (07.17.24 @ 08:23) >  IMPRESSION:    No acute intracranial pathology. Further evaluation with MRI can be   considered if the symptoms persist (if no contraindication).    These findings were discussed with Trevon GUERRA at 7/17/2024 8:31 AM by Dr. Alba with read back confirmation.    < end of copied text >    < from: CT Brain Perfusion Maps Stroke (07.17.24 @ 08:29) >  IMPRESSION:    CT PERFUSION:  No perfusion defect using standard threshold.    CTA HEAD/NECK:  No large vessel occlusion, stenosis, aneurysm, or vascular malformation.    Prominent bilateral palatine tonsils. Mildly enlarged right level 2A and   bilateral level 5 lymph nodes, nonspecific in etiology. Recommend   nonemergent follow-up with ENT.      MEDICATIONS  (STANDING):  chlorhexidine 2% Cloths 1 Application(s) Topical <User Schedule>  dextrose 5%. 1000 milliLiter(s) (100 mL/Hr) IV Continuous <Continuous>  dextrose 5%. 1000 milliLiter(s) (50 mL/Hr) IV Continuous <Continuous>  dextrose 50% Injectable 25 Gram(s) IV Push once  dextrose 50% Injectable 12.5 Gram(s) IV Push once  dextrose 50% Injectable 25 Gram(s) IV Push once  enoxaparin Injectable 40 milliGRAM(s) SubCutaneous every 24 hours  glucagon  Injectable 1 milliGRAM(s) IntraMuscular once  insulin lispro (ADMELOG) corrective regimen sliding scale   SubCutaneous three times a day before meals  lactated ringers. 1000 milliLiter(s) (80 mL/Hr) IV Continuous <Continuous>  methocarbamol 750 milliGRAM(s) Oral at bedtime  sodium chloride 0.9%. 1000 milliLiter(s) (50 mL/Hr) IV Continuous <Continuous>    MEDICATIONS  (PRN):  dextrose Oral Gel 15 Gram(s) Oral once PRN Blood Glucose LESS THAN 70 milliGRAM(s)/deciliter  meclizine 25 milliGRAM(s) Oral three times a day PRN for dizziness  SUMAtriptan 100 milliGRAM(s) Oral daily PRN for headache

## 2024-07-19 NOTE — BH CONSULTATION LIAISON ASSESSMENT NOTE - NSBHMSERECMEM_PSY_A_CORE
Left message for patient at      Telephone Information:   Mobile [phone]    to schedule Consult.  Patient to return call to [facility] [phone]   Unable to assess

## 2024-07-19 NOTE — DISCHARGE NOTE NURSING/CASE MANAGEMENT/SOCIAL WORK - PATIENT PORTAL LINK FT
You can access the FollowMyHealth Patient Portal offered by [facility] by registering at the following website: [email or web address]. By joining [employer]’s FollowMyHealth portal, you will also be able to view your health information using other applications (apps) compatible with our system.
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
## 2024-07-19 NOTE — BH CONSULTATION LIAISON ASSESSMENT NOTE - NSBHCHARTREVIEWVS_PSY_A_CORE FT
Vital Signs Last 24 Hrs  T(C): 36.6 (19 Jul 2024 04:58), Max: 36.9 (18 Jul 2024 19:53)  T(F): 97.8 (19 Jul 2024 04:58), Max: 98.5 (18 Jul 2024 19:53)  HR: 90 (19 Jul 2024 04:58) (90 - 98)  BP: 134/68 (19 Jul 2024 04:58) (125/72 - 134/68)  BP(mean): --  RR: 18 (19 Jul 2024 04:58) (18 - 18)  SpO2: --

## 2024-07-25 ENCOUNTER — RX RENEWAL (OUTPATIENT)
Age: 54
End: 2024-07-25

## 2024-08-19 ENCOUNTER — RX RENEWAL (OUTPATIENT)
Age: 54
End: 2024-08-19

## 2024-09-17 ENCOUNTER — APPOINTMENT (OUTPATIENT)
Dept: NEUROLOGY | Facility: CLINIC | Age: 54
End: 2024-09-17
Payer: MEDICARE

## 2024-09-17 VITALS
SYSTOLIC BLOOD PRESSURE: 134 MMHG | BODY MASS INDEX: 29.23 KG/M2 | WEIGHT: 165 LBS | HEIGHT: 63 IN | HEART RATE: 118 BPM | DIASTOLIC BLOOD PRESSURE: 80 MMHG

## 2024-09-17 DIAGNOSIS — F07.81 POSTCONCUSSIONAL SYNDROME: ICD-10-CM

## 2024-09-17 DIAGNOSIS — S09.90XA UNSPECIFIED INJURY OF HEAD, INITIAL ENCOUNTER: ICD-10-CM

## 2024-09-17 DIAGNOSIS — M54.12 RADICULOPATHY, CERVICAL REGION: ICD-10-CM

## 2024-09-17 DIAGNOSIS — R41.3 UNSPECIFIED INJURY OF HEAD, INITIAL ENCOUNTER: ICD-10-CM

## 2024-09-17 DIAGNOSIS — G44.309 POST-TRAUMATIC HEADACHE, UNSPECIFIED, NOT INTRACTABLE: ICD-10-CM

## 2024-09-17 PROCEDURE — 99213 OFFICE O/P EST LOW 20 MIN: CPT
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
## 2024-09-17 NOTE — PHYSICAL EXAM
[General Appearance - Alert] : alert [General Appearance - In No Acute Distress] : in no acute distress [Oriented To Time, Place, And Person] : oriented to person, place, and time [Impaired Insight] : insight and judgment were intact [Affect] : the affect was normal [Mood] : the mood was normal [Person] : oriented to person [Place] : oriented to place [Time] : oriented to time [Remote Intact] : remote memory intact [Visual Intact] : visual attention was ~T not ~L decreased [Naming Objects] : no difficulty naming common objects [Repeating Phrases] : no difficulty repeating a phrase [Writing A Sentence] : no difficulty writing a sentence [Comprehension] : comprehension intact [Reading] : reading intact [Past History] : adequate knowledge of personal past history [Cranial Nerves Optic (II)] : visual acuity intact bilaterally,  visual fields full to confrontation, pupils equal round and reactive to light [Cranial Nerves Oculomotor (III)] : extraocular motion intact [Cranial Nerves Trigeminal (V)] : facial sensation intact symmetrically [Cranial Nerves Facial (VII)] : face symmetrical [Cranial Nerves Vestibulocochlear (VIII)] : hearing was intact bilaterally [Cranial Nerves Glossopharyngeal (IX)] : tongue and palate midline [Cranial Nerves Accessory (XI - Cranial And Spinal)] : head turning and shoulder shrug symmetric [Cranial Nerves Hypoglossal (XII)] : there was no tongue deviation with protrusion [Motor Tone] : muscle tone was normal in all four extremities [Motor Strength] : muscle strength was normal in all four extremities [No Muscle Atrophy] : normal bulk in all four extremities [Motor Strength Upper Extremities Bilaterally] : there was weakness in both upper extremities [Sensation Tactile Decrease] : light touch was intact [Balance] : balance was intact [2+] : Ankle jerk left 2+ [PERRL With Normal Accommodation] : pupils were equal in size, round, reactive to light, with normal accommodation [Extraocular Movements] : extraocular movements were intact [Hearing Threshold Finger Rub Not [place]] : hearing was normal [Abnormal Walk] : normal gait [Involuntary Movements] : no involuntary movements were seen [Short Term Intact] : short term memory impaired [Concentration Intact] : a decrease in concentrating ability was observed [Fluency] : fluency not intact [Motor Strength Lower Extremities Bilaterally] : strength was normal in both lower extremities [Past-pointing] : there was no past-pointing [Tremor] : no tremor present [Coordination - Dysmetria Impaired Finger-to-Nose Bilateral] : not present [Plantar Reflex Right Only] : normal on the right [Plantar Reflex Left Only] : normal on the left [FreeTextEntry6] : Right hand  4-/5 Left hand  5/5  RUE 4-/5 bicep flexion / triceps extension LUE 4-/5 triceps [FreeTextEntry1] : Limited ROM and cerico-occipital tenderness

## 2025-03-18 ENCOUNTER — APPOINTMENT (OUTPATIENT)
Dept: NEUROLOGY | Facility: CLINIC | Age: 55
End: 2025-03-18

## 2025-05-28 ENCOUNTER — RX RENEWAL (OUTPATIENT)
Age: 55
End: 2025-05-28